# Patient Record
Sex: FEMALE | Race: BLACK OR AFRICAN AMERICAN | NOT HISPANIC OR LATINO | Employment: FULL TIME | URBAN - METROPOLITAN AREA
[De-identification: names, ages, dates, MRNs, and addresses within clinical notes are randomized per-mention and may not be internally consistent; named-entity substitution may affect disease eponyms.]

---

## 2020-02-20 ENCOUNTER — HOSPITAL ENCOUNTER (EMERGENCY)
Facility: HOSPITAL | Age: 50
Discharge: HOME | End: 2020-02-20
Attending: EMERGENCY MEDICINE
Payer: MEDICAID

## 2020-02-20 VITALS
DIASTOLIC BLOOD PRESSURE: 94 MMHG | TEMPERATURE: 98 F | WEIGHT: 155.56 LBS | RESPIRATION RATE: 20 BRPM | SYSTOLIC BLOOD PRESSURE: 156 MMHG | OXYGEN SATURATION: 99 % | HEART RATE: 76 BPM

## 2020-02-20 DIAGNOSIS — E04.9 GOITER: ICD-10-CM

## 2020-02-20 DIAGNOSIS — H00.014 HORDEOLUM EXTERNUM OF LEFT UPPER EYELID: Primary | ICD-10-CM

## 2020-02-20 DIAGNOSIS — R03.0 ELEVATED BLOOD PRESSURE READING: ICD-10-CM

## 2020-02-20 LAB — HIV 1+2 AB+HIV1 P24 AG SERPL QL IA: NEGATIVE

## 2020-02-20 PROCEDURE — 99283 EMERGENCY DEPT VISIT LOW MDM: CPT | Mod: ER

## 2020-02-20 PROCEDURE — 86703 HIV-1/HIV-2 1 RESULT ANTBDY: CPT

## 2020-02-20 RX ORDER — ERYTHROMYCIN 5 MG/G
OINTMENT OPHTHALMIC
Qty: 1 TUBE | Refills: 0 | Status: SHIPPED | OUTPATIENT
Start: 2020-02-20 | End: 2020-10-12

## 2020-02-20 NOTE — ED PROVIDER NOTES
History     Chief Complaint   Patient presents with    Eye Problem     woke up with left eye swollen and crusted shut       Review of patient's allergies indicates:  No Known Allergies    History of Present Illness   HPI    2/20/2020, 8:09 AM  The history is provided by the patient    Angeline Waggoner is a 49 y.o. female presenting to the ED for swelling to the left eyelid.  Onset was this morning when she woke up.  Patient notes that she has been having her eyelid crusted shut this morning.  No eye pain, no double vision, fever, rhinorrhea, cough.  Nothing makes it better, nothing makes it worse.  Patient denies any trauma.  Symptoms are rated as moderate.  Denies trauma.      Arrival mode:  Personal Vehicle    PCP: Nnamdi Perea [facility]     Allergies:  Review of patient's allergies indicates:  No Known Allergies    Past Medical History:  History reviewed. No pertinent past medical history.    Past Surgical History:  History reviewed. No pertinent surgical history.      Family History:  History reviewed. No pertinent family history.    Social History:  Social History     Tobacco Use    Smoking status: Never Smoker   Substance and Sexual Activity    Alcohol use: Never     Frequency: Never    Drug use: Never    Sexual activity: Not on file        Review of Systems   Review of Systems   Constitutional: Negative for chills and fever.   HENT: Positive for facial swelling (Left Upper Eyelid swelling. ). Negative for postnasal drip, rhinorrhea, sinus pain, sneezing and sore throat.    Eyes: Positive for discharge. Negative for photophobia, pain, redness, itching and visual disturbance.   Respiratory: Negative for cough.    Allergic/Immunologic: Negative for environmental allergies.          Physical Exam     Initial Vitals [02/20/20 0811]   BP Pulse Resp Temp SpO2   (!) 156/94 76 20 98.3 °F (36.8 °C) 99 %      MAP       --          Physical Exam    Nursing Notes and Vital Signs Reviewed.  Constitutional:  Patient is in no apparent distress. Well-developed and well-nourished.  Head: Atraumatic. Normocephalic.  Eyes: PERRL. EOM intact. Conjunctivae are not pale. No scleral icterus.  There is swelling noted to the left upper lateral eyelid.  Pupils are reactive. No drainage or discharge appreciated. Extraocular muscles are intact. No pain with direct or consensual light reflex. Minimal conjunctival injection.        ENT: Mucous membranes are moist. Oropharynx is clear and symmetric.  TMs pearly gray bilaterally. Pharynx without erythema or exudate.  Nasal mucosa is normal.  Neck: Supple. Full ROM. No lymphadenopathy.  Positive for goiter.  Cardiovascular: Regular rate. Regular rhythm. No murmurs, rubs, or gallops. Distal pulses are 2+ and symmetric.  Pulmonary/Chest: No respiratory distress. Clear to auscultation bilaterally. No wheezing or rales.  Musculoskeletal: Moves all extremities. No obvious deformities. No edema. No calf tenderness.  Skin: Warm and dry.  Neurological:  Alert, awake, and appropriate.  Normal speech.  No acute focal neurological deficits are appreciated.  Psychiatric: Normal affect. Good eye contact. Appropriate in content.     ED Course     Procedures    ED Vital Signs:  Vitals:    02/20/20 0811   BP: (!) 156/94   Pulse: 76   Resp: 20   Temp: 98.3 °F (36.8 °C)   TempSrc: Oral   SpO2: 99%   Weight: 70.5 kg (155 lb 8.6 oz)       Abnormal Lab Results:  Labs Reviewed   HIV 1 / 2 ANTIBODY        All Lab Results:None        Imaging Results:  Imaging Results    None          The Emergency Provider reviewed the vital signs and test results, which are outlined above.     ED Discussion      8:28 AM  Reassessment: Dr. Christian reassessed the pt.  The pt is resting comfortably and is NAD.  Pt states their sx have improved. Discussed test results, shared treatment plan, specific conditions for return, and the need for f/u.  Answered their questions at this time.  Pt understands and agrees to the plan.   Discussed findings of goiter.  Recommended patient follow up regarding her goiter.  The pt has remained hemodynamically stable through ED course and is stable for discharge.    I discussed with patient and/or family/caretaker that evaluation in the ED does not suggest any emergent or life threatening medical conditions requiring immediate intervention beyond what was provided in the ED, and I believe patient is safe for discharge.  Regardless, an unremarkable evaluation in the ED does not preclude the development or presence of a serious of life threatening condition. As such, patient was instructed to return immediately for any worsening or change in current symptoms.      ED Medication(s):  Medications - No data to display       Medication List      START taking these medications    erythromycin ophthalmic ointment  Commonly known as:  ROMYCIN  Place a 1/2 inch ribbon to left eye every every 6 hours for 7 days.           Where to Get Your Medications      You can get these medications from any pharmacy    Bring a paper prescription for each of these medications  · erythromycin ophthalmic ointment         Follow-up Information     [facility] In 2 days.    Why:  To evaluate goiter.  Return to emergency department for worsening pain, swelling, bowel pain, double vision, fever, worsening swelling, or other concerns.  Contact information:  [address]  [place] [id number] [phone]                        MIPS Measures     Smoker? No     Hypertension: Pre-hypertension/Hypertension: The pt has been informed that they may have pre-hypertension or hypertension based on a blood pressure reading in the ED. I recommend that the pt call the PCP listed on their discharge instructions or a physician of their choice this week to arrange f/u for further evaluation of possible pre-hypertension or hypertension.        Medical Decision Making                 MDM  Reviewed: vitals and nursing  "note          Portions of this note may have been created with voice recognition software. Occasional "wrong-word" or "sound-a-like" substitutions may have occurred due to the inherent limitations of voice recognition software. Please, read the note carefully and recognize, using context, where substitutions have occurred.        Clinical Impression       ICD-10-CM ICD-9-CM   1. Hordeolum externum of left upper eyelid H00.014 373.11   2. Elevated blood pressure reading R03.0 796.2   3. Goiter E04.9 240.9            Disposition: Discharge to home  Patient condition: Stable           Alla Christian, DO  02/20/20 0833       Alla Christian, DO  02/20/20 0834    "

## 2020-02-20 NOTE — DISCHARGE INSTRUCTIONS
To ease your symptoms and help your stye get better, you can put a warm, wet compress on the stye. Wet a clean washcloth with warm water and put it over your stye. When the washcloth cools, reheat it with warm water and put it back over the stye. Repeat these steps for about 15 minutes, and try to do this 4 times a day.  You should not squeeze or pop your stye. This can make it worse. Also, you should not wear eye makeup or contact lenses until your stye is all better.

## 2020-10-12 ENCOUNTER — HOSPITAL ENCOUNTER (OUTPATIENT)
Facility: HOSPITAL | Age: 50
Discharge: HOME | End: 2020-10-14
Attending: EMERGENCY MEDICINE | Admitting: INTERNAL MEDICINE
Payer: MEDICAID

## 2020-10-12 DIAGNOSIS — N93.8 DUB (DYSFUNCTIONAL UTERINE BLEEDING): ICD-10-CM

## 2020-10-12 DIAGNOSIS — D64.9 SYMPTOMATIC ANEMIA: Primary | ICD-10-CM

## 2020-10-12 DIAGNOSIS — R31.9 URINARY TRACT INFECTION WITH HEMATURIA, SITE UNSPECIFIED: ICD-10-CM

## 2020-10-12 DIAGNOSIS — R79.89 ELEVATED TROPONIN: ICD-10-CM

## 2020-10-12 DIAGNOSIS — N39.0 URINARY TRACT INFECTION WITH HEMATURIA, SITE UNSPECIFIED: ICD-10-CM

## 2020-10-12 DIAGNOSIS — E87.6 HYPOKALEMIA: ICD-10-CM

## 2020-10-12 DIAGNOSIS — R42 DIZZINESS: ICD-10-CM

## 2020-10-12 DIAGNOSIS — I10 HYPERTENSION, UNSPECIFIED TYPE: ICD-10-CM

## 2020-10-12 LAB
ABO + RH BLD: NORMAL
ALBUMIN SERPL BCP-MCNC: 3.6 G/DL (ref 3.5–5.2)
ALP SERPL-CCNC: 47 U/L (ref 55–135)
ALT SERPL W/O P-5'-P-CCNC: 18 U/L (ref 10–44)
ANION GAP SERPL CALC-SCNC: 11 MMOL/L (ref 8–16)
ANISOCYTOSIS BLD QL SMEAR: ABNORMAL
AST SERPL-CCNC: 18 U/L (ref 10–40)
B-HCG UR QL: NEGATIVE
BACTERIA #/AREA URNS AUTO: ABNORMAL /HPF
BASOPHILS # BLD AUTO: 0.04 K/UL (ref 0–0.2)
BASOPHILS NFR BLD: 0.6 % (ref 0–1.9)
BILIRUB SERPL-MCNC: 0.3 MG/DL (ref 0.1–1)
BILIRUB UR QL STRIP: NEGATIVE
BLD GP AB SCN CELLS X3 SERPL QL: NORMAL
BNP SERPL-MCNC: <10 PG/ML (ref 0–99)
BUN SERPL-MCNC: 7 MG/DL (ref 6–20)
CALCIUM SERPL-MCNC: 8.4 MG/DL (ref 8.7–10.5)
CHLORIDE SERPL-SCNC: 110 MMOL/L (ref 95–110)
CLARITY UR REFRACT.AUTO: ABNORMAL
CO2 SERPL-SCNC: 18 MMOL/L (ref 23–29)
COLOR UR AUTO: ABNORMAL
CREAT SERPL-MCNC: 0.8 MG/DL (ref 0.5–1.4)
DACRYOCYTES BLD QL SMEAR: ABNORMAL
DIFFERENTIAL METHOD: ABNORMAL
EOSINOPHIL # BLD AUTO: 0 K/UL (ref 0–0.5)
EOSINOPHIL NFR BLD: 0 % (ref 0–8)
ERYTHROCYTE [DISTWIDTH] IN BLOOD BY AUTOMATED COUNT: 18.4 % (ref 11.5–14.5)
EST. GFR  (AFRICAN AMERICAN): >60 ML/MIN/1.73 M^2
EST. GFR  (NON AFRICAN AMERICAN): >60 ML/MIN/1.73 M^2
GLUCOSE SERPL-MCNC: 111 MG/DL (ref 70–110)
GLUCOSE UR QL STRIP: ABNORMAL
HCT VFR BLD AUTO: 17.9 % (ref 37–48.5)
HGB BLD-MCNC: 5 G/DL (ref 12–16)
HGB UR QL STRIP: ABNORMAL
HYALINE CASTS UR QL AUTO: 0 /LPF
HYPOCHROMIA BLD QL SMEAR: ABNORMAL
IMM GRANULOCYTES # BLD AUTO: 0.01 K/UL (ref 0–0.04)
IMM GRANULOCYTES NFR BLD AUTO: 0.2 % (ref 0–0.5)
KETONES UR QL STRIP: ABNORMAL
LEUKOCYTE ESTERASE UR QL STRIP: ABNORMAL
LYMPHOCYTES # BLD AUTO: 1.8 K/UL (ref 1–4.8)
LYMPHOCYTES NFR BLD: 27.4 % (ref 18–48)
MCH RBC QN AUTO: 19.2 PG (ref 27–31)
MCHC RBC AUTO-ENTMCNC: 27.9 G/DL (ref 32–36)
MCV RBC AUTO: 69 FL (ref 82–98)
MICROSCOPIC COMMENT: ABNORMAL
MONOCYTES # BLD AUTO: 0.5 K/UL (ref 0.3–1)
MONOCYTES NFR BLD: 7 % (ref 4–15)
NEUTROPHILS # BLD AUTO: 4.3 K/UL (ref 1.8–7.7)
NEUTROPHILS NFR BLD: 64.8 % (ref 38–73)
NITRITE UR QL STRIP: POSITIVE
NRBC BLD-RTO: 0 /100 WBC
OVALOCYTES BLD QL SMEAR: ABNORMAL
PH UR STRIP: 5 [PH] (ref 5–8)
PLATELET # BLD AUTO: 322 K/UL (ref 150–350)
PMV BLD AUTO: 10.2 FL (ref 9.2–12.9)
POIKILOCYTOSIS BLD QL SMEAR: SLIGHT
POLYCHROMASIA BLD QL SMEAR: ABNORMAL
POTASSIUM SERPL-SCNC: 3.3 MMOL/L (ref 3.5–5.1)
PROT SERPL-MCNC: 7.4 G/DL (ref 6–8.4)
PROT UR QL STRIP: ABNORMAL
RBC # BLD AUTO: 2.61 M/UL (ref 4–5.4)
RBC #/AREA URNS AUTO: >100 /HPF (ref 0–4)
SARS-COV-2 RDRP RESP QL NAA+PROBE: NEGATIVE
SCHISTOCYTES BLD QL SMEAR: ABNORMAL
SODIUM SERPL-SCNC: 139 MMOL/L (ref 136–145)
SP GR UR STRIP: 1.01 (ref 1–1.03)
SQUAMOUS #/AREA URNS AUTO: 2 /HPF
TROPONIN I SERPL DL<=0.01 NG/ML-MCNC: 0.03 NG/ML (ref 0–0.03)
URN SPEC COLLECT METH UR: ABNORMAL
UROBILINOGEN UR STRIP-ACNC: >=8 EU/DL
WBC # BLD AUTO: 6.6 K/UL (ref 3.9–12.7)
WBC #/AREA URNS AUTO: 10 /HPF (ref 0–5)

## 2020-10-12 PROCEDURE — 83880 ASSAY OF NATRIURETIC PEPTIDE: CPT | Mod: ER

## 2020-10-12 PROCEDURE — U0002 COVID-19 LAB TEST NON-CDC: HCPCS | Mod: ER

## 2020-10-12 PROCEDURE — 93010 ELECTROCARDIOGRAM REPORT: CPT | Mod: ,,, | Performed by: INTERNAL MEDICINE

## 2020-10-12 PROCEDURE — 87040 BLOOD CULTURE FOR BACTERIA: CPT

## 2020-10-12 PROCEDURE — 86880 COOMBS TEST DIRECT: CPT

## 2020-10-12 PROCEDURE — 25000003 PHARM REV CODE 250: Mod: ER | Performed by: EMERGENCY MEDICINE

## 2020-10-12 PROCEDURE — 36415 COLL VENOUS BLD VENIPUNCTURE: CPT

## 2020-10-12 PROCEDURE — 63600175 PHARM REV CODE 636 W HCPCS: Mod: ER | Performed by: EMERGENCY MEDICINE

## 2020-10-12 PROCEDURE — 93010 EKG 12-LEAD: ICD-10-PCS | Mod: ,,, | Performed by: INTERNAL MEDICINE

## 2020-10-12 PROCEDURE — 99291 CRITICAL CARE FIRST HOUR: CPT | Mod: ER

## 2020-10-12 PROCEDURE — 86920 COMPATIBILITY TEST SPIN: CPT

## 2020-10-12 PROCEDURE — 93005 ELECTROCARDIOGRAM TRACING: CPT | Mod: ER

## 2020-10-12 PROCEDURE — 84484 ASSAY OF TROPONIN QUANT: CPT | Mod: ER

## 2020-10-12 PROCEDURE — G0378 HOSPITAL OBSERVATION PER HR: HCPCS | Mod: ER

## 2020-10-12 PROCEDURE — 81000 URINALYSIS NONAUTO W/SCOPE: CPT | Mod: ER

## 2020-10-12 PROCEDURE — 85025 COMPLETE CBC W/AUTO DIFF WBC: CPT | Mod: ER

## 2020-10-12 PROCEDURE — 96361 HYDRATE IV INFUSION ADD-ON: CPT | Mod: ER

## 2020-10-12 PROCEDURE — 96365 THER/PROPH/DIAG IV INF INIT: CPT | Performed by: EMERGENCY MEDICINE

## 2020-10-12 PROCEDURE — G0378 HOSPITAL OBSERVATION PER HR: HCPCS

## 2020-10-12 PROCEDURE — 83010 ASSAY OF HAPTOGLOBIN QUANT: CPT

## 2020-10-12 PROCEDURE — 36430 TRANSFUSION BLD/BLD COMPNT: CPT

## 2020-10-12 PROCEDURE — 81025 URINE PREGNANCY TEST: CPT | Mod: ER

## 2020-10-12 PROCEDURE — 86901 BLOOD TYPING SEROLOGIC RH(D): CPT

## 2020-10-12 PROCEDURE — P9016 RBC LEUKOCYTES REDUCED: HCPCS

## 2020-10-12 PROCEDURE — 83540 ASSAY OF IRON: CPT

## 2020-10-12 PROCEDURE — 82728 ASSAY OF FERRITIN: CPT

## 2020-10-12 PROCEDURE — 80053 COMPREHEN METABOLIC PANEL: CPT | Mod: ER

## 2020-10-12 RX ORDER — IBUPROFEN 200 MG
24 TABLET ORAL
Status: DISCONTINUED | OUTPATIENT
Start: 2020-10-12 | End: 2020-10-14 | Stop reason: HOSPADM

## 2020-10-12 RX ORDER — GLUCAGON 1 MG
1 KIT INJECTION
Status: DISCONTINUED | OUTPATIENT
Start: 2020-10-12 | End: 2020-10-14 | Stop reason: HOSPADM

## 2020-10-12 RX ORDER — SODIUM CHLORIDE 0.9 % (FLUSH) 0.9 %
10 SYRINGE (ML) INJECTION
Status: DISCONTINUED | OUTPATIENT
Start: 2020-10-12 | End: 2020-10-14 | Stop reason: HOSPADM

## 2020-10-12 RX ORDER — IBUPROFEN 200 MG
16 TABLET ORAL
Status: DISCONTINUED | OUTPATIENT
Start: 2020-10-12 | End: 2020-10-14 | Stop reason: HOSPADM

## 2020-10-12 RX ORDER — POTASSIUM CHLORIDE 20 MEQ/1
40 TABLET, EXTENDED RELEASE ORAL
Status: COMPLETED | OUTPATIENT
Start: 2020-10-12 | End: 2020-10-12

## 2020-10-12 RX ORDER — HYDROCODONE BITARTRATE AND ACETAMINOPHEN 500; 5 MG/1; MG/1
TABLET ORAL
Status: DISCONTINUED | OUTPATIENT
Start: 2020-10-12 | End: 2020-10-14 | Stop reason: HOSPADM

## 2020-10-12 RX ADMIN — CEFTRIAXONE 1 G: 1 INJECTION, SOLUTION INTRAVENOUS at 03:10

## 2020-10-12 RX ADMIN — POTASSIUM CHLORIDE 40 MEQ: 1500 TABLET, EXTENDED RELEASE ORAL at 03:10

## 2020-10-12 RX ADMIN — SODIUM CHLORIDE 1000 ML: 0.9 INJECTION, SOLUTION INTRAVENOUS at 01:10

## 2020-10-12 NOTE — ED NOTES
"Patient states she came to ED today because "I was bleeding too much, I was at work and I started feeling lightheaded". Pt states she has been having abnormal vaginal bleeding x3-4 weeks, and has been going through 3-4 16 count boxes of tampons, every 3-4 days. Patient states "Loida been feeling weak". Patient states she was taking something for blood pressure medication but states she took herself off "and I shouldn't have" pt states.  "

## 2020-10-12 NOTE — ED PROVIDER NOTES
Encounter Date: 10/12/2020       History     Chief Complaint   Patient presents with    Dizziness     pt c/o having a period for past month. light headed since Sat     The history is provided by the patient.   Dizziness  This is a new problem. The current episode started yesterday. The problem occurs daily. The problem has not changed since onset.Pertinent negatives include no chest pain, no abdominal pain, no headaches and no shortness of breath. Associated symptoms comments: Vaginal Bleeding for several weeks. The symptoms are aggravated by walking. The symptoms are relieved by rest. She has tried rest for the symptoms. The treatment provided mild relief.     Review of patient's allergies indicates:   Allergen Reactions    Aspirin      Past Medical History:   Diagnosis Date    Hypertension      History reviewed. No pertinent surgical history.  History reviewed. No pertinent family history.  Social History     Tobacco Use    Smoking status: Never Smoker   Substance Use Topics    Alcohol use: Never     Frequency: Never    Drug use: Never     Review of Systems   Respiratory: Negative for shortness of breath.    Cardiovascular: Negative for chest pain.   Gastrointestinal: Negative for abdominal pain.   Genitourinary: Positive for vaginal bleeding.   Neurological: Positive for dizziness. Negative for headaches.   All other systems reviewed and are negative.      Physical Exam     Initial Vitals [10/12/20 1326]   BP Pulse Resp Temp SpO2   (!) 141/91 98 18 98.9 °F (37.2 °C) 100 %      MAP       --         Physical Exam    Nursing note and vitals reviewed.  Constitutional: She appears well-developed and well-nourished. No distress.   HENT:   Head: Normocephalic and atraumatic.   Mouth/Throat: Oropharynx is clear and moist.   Eyes: Conjunctivae and EOM are normal. Pupils are equal, round, and reactive to light.   Neck: Normal range of motion. Neck supple.   Cardiovascular: Normal rate, regular rhythm and normal heart  sounds.   Pulmonary/Chest: Breath sounds normal. No respiratory distress.   Abdominal: Soft. Bowel sounds are normal. She exhibits no distension. There is no abdominal tenderness.   Musculoskeletal: Normal range of motion.   Neurological: She is alert and oriented to person, place, and time. She has normal strength.   Skin: Skin is warm and dry. There is pallor.   Psychiatric: She has a normal mood and affect. Thought content normal.         ED Course   Critical Care    Date/Time: 10/12/2020 3:32 PM  Performed by: Anatoliy Wall MD  Authorized by: Anatoliy Wall MD   Total critical care time (exclusive of procedural time) : 45 minutes  Critical care time was exclusive of separately billable procedures and treating other patients.  Critical care was necessary to treat or prevent imminent or life-threatening deterioration of the following conditions: cardiac failure and circulatory failure.        Labs Reviewed   CBC W/ AUTO DIFFERENTIAL - Abnormal; Notable for the following components:       Result Value    RBC 2.61 (*)     Hemoglobin 5.0 (*)     Hematocrit 17.9 (*)     Mean Corpuscular Volume 69 (*)     Mean Corpuscular Hemoglobin 19.2 (*)     Mean Corpuscular Hemoglobin Conc 27.9 (*)     RDW 18.4 (*)     All other components within normal limits    Narrative:        HGB and HCT critical result(s) called and verbal readback obtained   from Kavita Machado RN 10/12/20 14:24 [facility].  by [facility] 10/12/2020 14:24   COMPREHENSIVE METABOLIC PANEL - Abnormal; Notable for the following components:    Potassium 3.3 (*)     CO2 18 (*)     Glucose 111 (*)     Calcium 8.4 (*)     Alkaline Phosphatase 47 (*)     All other components within normal limits   URINALYSIS, REFLEX TO URINE CULTURE - Abnormal; Notable for the following components:    Color, UA Red (*)     Appearance, UA Cloudy (*)     Protein, UA 3+ (*)     Glucose, UA Trace (*)     Ketones, UA 2+ (*)     Occult Blood UA 3+ (*)     Nitrite, UA Positive (*)      Urobilinogen, UA >=8.0 (*)     Leukocytes, UA 2+ (*)     All other components within normal limits    Narrative:     Specimen Source->Urine   TROPONIN I - Abnormal; Notable for the following components:    Troponin I 0.031 (*)     All other components within normal limits   URINALYSIS MICROSCOPIC - Abnormal; Notable for the following components:    RBC, UA >100 (*)     WBC, UA 10 (*)     All other components within normal limits    Narrative:     Specimen Source->Urine   CULTURE, BLOOD   CULTURE, BLOOD   PREGNANCY TEST, URINE RAPID    Narrative:     Specimen Source->Urine   B-TYPE NATRIURETIC PEPTIDE   SARS-COV-2 RNA AMPLIFICATION, QUAL     Results for orders placed or performed during the hospital encounter of 10/12/20   CBC W/ AUTO DIFFERENTIAL   Result Value Ref Range    WBC 6.60 3.90 - 12.70 K/uL    RBC 2.61 (L) 4.00 - 5.40 M/uL    Hemoglobin 5.0 (LL) 12.0 - 16.0 g/dL    Hematocrit 17.9 (LL) 37.0 - 48.5 %    Mean Corpuscular Volume 69 (L) 82 - 98 fL    Mean Corpuscular Hemoglobin 19.2 (L) 27.0 - 31.0 pg    Mean Corpuscular Hemoglobin Conc 27.9 (L) 32.0 - 36.0 g/dL    RDW 18.4 (H) 11.5 - 14.5 %    Platelets 322 150 - 350 K/uL    MPV 10.2 9.2 - 12.9 fL    Immature Granulocytes 0.2 0.0 - 0.5 %    Gran # (ANC) 4.3 1.8 - 7.7 K/uL    Immature Grans (Abs) 0.01 0.00 - 0.04 K/uL    Lymph # 1.8 1.0 - 4.8 K/uL    Mono # 0.5 0.3 - 1.0 K/uL    Eos # 0.0 0.0 - 0.5 K/uL    Baso # 0.04 0.00 - 0.20 K/uL    nRBC 0 0 /100 WBC    Gran% 64.8 38.0 - 73.0 %    Lymph% 27.4 18.0 - 48.0 %    Mono% 7.0 4.0 - 15.0 %    Eosinophil% 0.0 0.0 - 8.0 %    Basophil% 0.6 0.0 - 1.9 %    Differential Method Automated    Comp. Metabolic Panel   Result Value Ref Range    Sodium 139 136 - 145 mmol/L    Potassium 3.3 (L) 3.5 - 5.1 mmol/L    Chloride 110 95 - 110 mmol/L    CO2 18 (L) 23 - 29 mmol/L    Glucose 111 (H) 70 - 110 mg/dL    BUN, Bld 7 6 - 20 mg/dL    Creatinine 0.8 0.5 - 1.4 mg/dL    Calcium 8.4 (L) 8.7 - 10.5 mg/dL    Total Protein 7.4 6.0  - 8.4 g/dL    Albumin 3.6 3.5 - 5.2 g/dL    Total Bilirubin 0.3 0.1 - 1.0 mg/dL    Alkaline Phosphatase 47 (L) 55 - 135 U/L    AST 18 10 - 40 U/L    ALT 18 10 - 44 U/L    Anion Gap 11 8 - 16 mmol/L    eGFR if African American >60.0 >60 mL/min/1.73 m^2    eGFR if non African American >60.0 >60 mL/min/1.73 m^2   Pregnancy, urine rapid   Result Value Ref Range    Preg Test, Ur Negative    Urinalysis, Reflex to Urine Culture Urine, Clean Catch    Specimen: Urine   Result Value Ref Range    Specimen UA Urine, Clean Catch     Color, UA Red (A) Yellow, Straw, Shanna    Appearance, UA Cloudy (A) Clear    pH, UA 5.0 5.0 - 8.0    Specific Gravity, UA 1.015 1.005 - 1.030    Protein, UA 3+ (A) Negative    Glucose, UA Trace (A) Negative    Ketones, UA 2+ (A) Negative    Bilirubin (UA) Negative Negative    Occult Blood UA 3+ (A) Negative    Nitrite, UA Positive (A) Negative    Urobilinogen, UA >=8.0 (A) <2.0 EU/dL    Leukocytes, UA 2+ (A) Negative   Troponin I   Result Value Ref Range    Troponin I 0.031 (H) 0.000 - 0.026 ng/mL   Brain natriuretic peptide   Result Value Ref Range    BNP <10 0 - 99 pg/mL   Urinalysis Microscopic   Result Value Ref Range    RBC, UA >100 (H) 0 - 4 /hpf    WBC, UA 10 (H) 0 - 5 /hpf    Bacteria Occasional None-Occ /hpf    Squam Epithel, UA 2 /hpf    Hyaline Casts, UA 0 0-1/lpf /lpf    Microscopic Comment SEE COMMENT    COVID-19 Rapid Screening   Result Value Ref Range    SARS-CoV-2 RNA, Amplification, Qual Negative Negative          ECG Results          EKG 12-lead (In process)  Result time 10/12/20 14:42:04    In process by Interface, Lab In [facility] (10/12/20 14:42:04)                 Narrative:    Test Reason : R42,    Vent. Rate : 090 BPM     Atrial Rate : 090 BPM     P-R Int : 158 ms          QRS Dur : 080 ms      QT Int : 342 ms       P-R-T Axes : 027 018 115 degrees     QTc Int : 418 ms    Normal sinus rhythm  Possible Left atrial enlargement  LVH  T wave abnormality, consider lateral  ischemia  Abnormal ECG  No previous ECGs available    Referred By: [email or web address]   SELF           Confirmed By:                             Imaging Results          X-Ray Chest 1 View (Final result)  Result time 10/12/20 13:58:59    Final result by Александр Garcia MD (10/12/20 13:58:59)                 Impression:      1.  Negative for acute process involving the chest.    2.  Incidental findings as noted above.      Electronically signed by: Александр Garcia MD  Date:    10/12/2020  Time:    13:58             Narrative:    EXAMINATION:  XR CHEST 1 VIEW    CLINICAL HISTORY:  Dizziness and giddiness    COMPARISON:  No comparison studies are available.    FINDINGS:  EKG leads overlie the chest which is lordotic in position.  The lungs are clear. The cardiac silhouette size is normal. The trachea is midline and the mediastinal width is normal. Negative for focal infiltrate, effusion or pneumothorax. Pulmonary vasculature is normal. Negative for osseous abnormalities. Tortuous aorta.  Marginal spondylosis with minimal convex left curvature of the thoracolumbar junction.                            3:15 PM GYN Consult- , Provera 10mg q d for 2 weeks, transfuse.   3:35 PM Discussed lab/imaging studies with patient and the need for further evaluation/admission for Anemia, blood tx, Observation. Pt verbalized understanding that this is a stand alone ER and we are unable to admit at this facility. Pt will be transferred to [facility] via [facility] with care en route to include Cardiac Monitoring. I discussed this case with HS and care was accepted by Dr Rascon.                                       Clinical Impression:       ICD-10-CM ICD-9-CM   1. Symptomatic anemia  D64.9 285.9   2. Dizziness  R42 780.4   3. DUB (dysfunctional uterine bleeding)  N93.8 626.8   4. Hypertension, unspecified type  I10 401.9   5. Hypokalemia  E87.6 276.8   6. Urinary tract infection with hematuria, site unspecified  N39.0 599.0    R31.9 599.70    7. Elevated troponin  R77.8 790.6                      Disposition:   Disposition: Placed in Observation  Condition: Fair     ED Disposition Condition    Observation                             Anatoliy Wall MD  10/12/20 1536

## 2020-10-12 NOTE — PROGRESS NOTES
"Pt arrived to floor from [facility] ED   Pads provided to pt and instructed to keep a hourly count   Tele monitor placed ST on tele   BP (!) 177/74   Pulse 106   Temp 98.8 °F (37.1 °C)   Resp 18   Ht 5' 2" (1.575 m)   Wt 75.1 kg (165 lb 10.8 oz)   SpO2 100%   Breastfeeding No   BMI 30.30 kg/m²    /67   Pulse 78   Temp 98.8 °F (37.1 °C)   Resp 18   Ht 5' 2" (1.575 m)   Wt 75.1 kg (165 lb 10.8 oz)   SpO2 100%   Breastfeeding No   BMI 30.30 kg/m²     Fall precautions in place   notified MD of pt arrival   "

## 2020-10-13 PROBLEM — N93.9 VAGINAL BLEEDING: Status: ACTIVE | Noted: 2020-10-13

## 2020-10-13 PROBLEM — D50.0 IRON DEFICIENCY ANEMIA DUE TO CHRONIC BLOOD LOSS: Status: ACTIVE | Noted: 2020-10-13

## 2020-10-13 PROBLEM — N92.0 MENORRHAGIA: Status: ACTIVE | Noted: 2020-10-13

## 2020-10-13 PROBLEM — R55 PRE-SYNCOPE: Status: ACTIVE | Noted: 2020-10-13

## 2020-10-13 PROBLEM — D25.2 INTRAMURAL AND SUBSEROUS LEIOMYOMA OF UTERUS: Status: ACTIVE | Noted: 2020-10-13

## 2020-10-13 PROBLEM — D25.1 INTRAMURAL AND SUBSEROUS LEIOMYOMA OF UTERUS: Status: ACTIVE | Noted: 2020-10-13

## 2020-10-13 PROBLEM — E87.6 HYPOKALEMIA: Status: ACTIVE | Noted: 2020-10-13

## 2020-10-13 LAB
ANION GAP SERPL CALC-SCNC: 6 MMOL/L (ref 8–16)
BASOPHILS # BLD AUTO: 0.04 K/UL (ref 0–0.2)
BASOPHILS NFR BLD: 0.7 % (ref 0–1.9)
BLD PROD TYP BPU: NORMAL
BLD PROD TYP BPU: NORMAL
BLOOD UNIT EXPIRATION DATE: NORMAL
BLOOD UNIT EXPIRATION DATE: NORMAL
BLOOD UNIT TYPE CODE: 5100
BLOOD UNIT TYPE CODE: 5100
BLOOD UNIT TYPE: NORMAL
BLOOD UNIT TYPE: NORMAL
BUN SERPL-MCNC: 6 MG/DL (ref 6–20)
CALCIUM SERPL-MCNC: 7.9 MG/DL (ref 8.7–10.5)
CHLORIDE SERPL-SCNC: 113 MMOL/L (ref 95–110)
CO2 SERPL-SCNC: 19 MMOL/L (ref 23–29)
CODING SYSTEM: NORMAL
CODING SYSTEM: NORMAL
CREAT SERPL-MCNC: 0.8 MG/DL (ref 0.5–1.4)
DAT IGG-SP REAG RBC-IMP: NORMAL
DIFFERENTIAL METHOD: ABNORMAL
DISPENSE STATUS: NORMAL
DISPENSE STATUS: NORMAL
EOSINOPHIL # BLD AUTO: 0 K/UL (ref 0–0.5)
EOSINOPHIL NFR BLD: 0.7 % (ref 0–8)
ERYTHROCYTE [DISTWIDTH] IN BLOOD BY AUTOMATED COUNT: 19.3 % (ref 11.5–14.5)
EST. GFR  (AFRICAN AMERICAN): >60 ML/MIN/1.73 M^2
EST. GFR  (NON AFRICAN AMERICAN): >60 ML/MIN/1.73 M^2
FERRITIN SERPL-MCNC: <1 NG/ML (ref 20–300)
GLUCOSE SERPL-MCNC: 97 MG/DL (ref 70–110)
HAPTOGLOB SERPL-MCNC: 105 MG/DL (ref 30–250)
HCT VFR BLD AUTO: 25.9 % (ref 37–48.5)
HCT VFR BLD AUTO: 26.3 % (ref 37–48.5)
HGB BLD-MCNC: 7.8 G/DL (ref 12–16)
HGB BLD-MCNC: 8 G/DL (ref 12–16)
IMM GRANULOCYTES # BLD AUTO: 0.03 K/UL (ref 0–0.04)
IMM GRANULOCYTES NFR BLD AUTO: 0.6 % (ref 0–0.5)
IRON SERPL-MCNC: 18 UG/DL (ref 30–160)
LYMPHOCYTES # BLD AUTO: 1.7 K/UL (ref 1–4.8)
LYMPHOCYTES NFR BLD: 31.2 % (ref 18–48)
MAGNESIUM SERPL-MCNC: 1.8 MG/DL (ref 1.6–2.6)
MCH RBC QN AUTO: 22.5 PG (ref 27–31)
MCHC RBC AUTO-ENTMCNC: 30.1 G/DL (ref 32–36)
MCV RBC AUTO: 75 FL (ref 82–98)
MONOCYTES # BLD AUTO: 0.5 K/UL (ref 0.3–1)
MONOCYTES NFR BLD: 8.7 % (ref 4–15)
NEUTROPHILS # BLD AUTO: 3.1 K/UL (ref 1.8–7.7)
NEUTROPHILS NFR BLD: 58.1 % (ref 38–73)
NRBC BLD-RTO: 1 /100 WBC
NUM UNITS TRANS PACKED RBC: NORMAL
NUM UNITS TRANS PACKED RBC: NORMAL
PLATELET # BLD AUTO: 274 K/UL (ref 150–350)
PMV BLD AUTO: 10.6 FL (ref 9.2–12.9)
POTASSIUM SERPL-SCNC: 3.7 MMOL/L (ref 3.5–5.1)
RBC # BLD AUTO: 3.46 M/UL (ref 4–5.4)
SATURATED IRON: 3 % (ref 20–50)
SODIUM SERPL-SCNC: 138 MMOL/L (ref 136–145)
TOTAL IRON BINDING CAPACITY: 622 UG/DL (ref 250–450)
TRANSFERRIN SERPL-MCNC: 420 MG/DL (ref 200–375)
WBC # BLD AUTO: 5.41 K/UL (ref 3.9–12.7)

## 2020-10-13 PROCEDURE — 85018 HEMOGLOBIN: CPT | Mod: 91

## 2020-10-13 PROCEDURE — 85014 HEMATOCRIT: CPT | Mod: 91

## 2020-10-13 PROCEDURE — G0378 HOSPITAL OBSERVATION PER HR: HCPCS

## 2020-10-13 PROCEDURE — 25000003 PHARM REV CODE 250: Performed by: PHYSICIAN ASSISTANT

## 2020-10-13 PROCEDURE — 25000003 PHARM REV CODE 250: Performed by: FAMILY MEDICINE

## 2020-10-13 PROCEDURE — 80048 BASIC METABOLIC PNL TOTAL CA: CPT

## 2020-10-13 PROCEDURE — 96375 TX/PRO/DX INJ NEW DRUG ADDON: CPT | Performed by: EMERGENCY MEDICINE

## 2020-10-13 PROCEDURE — 36415 COLL VENOUS BLD VENIPUNCTURE: CPT

## 2020-10-13 PROCEDURE — P9016 RBC LEUKOCYTES REDUCED: HCPCS

## 2020-10-13 PROCEDURE — 83735 ASSAY OF MAGNESIUM: CPT

## 2020-10-13 PROCEDURE — 63600175 PHARM REV CODE 636 W HCPCS: Performed by: FAMILY MEDICINE

## 2020-10-13 PROCEDURE — 85025 COMPLETE CBC W/AUTO DIFF WBC: CPT

## 2020-10-13 RX ORDER — FERROUS SULFATE 325(65) MG
325 TABLET, DELAYED RELEASE (ENTERIC COATED) ORAL 2 TIMES DAILY
Status: DISCONTINUED | OUTPATIENT
Start: 2020-10-13 | End: 2020-10-14 | Stop reason: HOSPADM

## 2020-10-13 RX ORDER — MEDROXYPROGESTERONE ACETATE 5 MG/1
10 TABLET ORAL 2 TIMES DAILY
Status: DISCONTINUED | OUTPATIENT
Start: 2020-10-13 | End: 2020-10-14 | Stop reason: HOSPADM

## 2020-10-13 RX ADMIN — FERROUS SULFATE TAB EC 325 MG (65 MG FE EQUIVALENT) 325 MG: 325 (65 FE) TABLET DELAYED RESPONSE at 10:10

## 2020-10-13 RX ADMIN — MEDROXYPROGESTERONE ACETATE 10 MG: 5 TABLET ORAL at 10:10

## 2020-10-13 RX ADMIN — IRON SUCROSE 200 MG: 20 INJECTION, SOLUTION INTRAVENOUS at 11:10

## 2020-10-13 NOTE — NURSING
Per MD order, orthostatic BP completed. Vitals:  10/13/20 1559   1600  1601  BP: (!) 166/85(lying); (!) 161/77(sitting); (!) 167/98(standing)

## 2020-10-13 NOTE — ASSESSMENT & PLAN NOTE
Dysfunctional uterine bleeding with resulting acute on chronic blood loss anemia  -patient will need complete work up as outpatient in GYN clinic, needs updated cervical cancer screening and likely EMB  -Thyroid studies  -Follow up pelvic US   -appropriate response to PRBC  -Start Provera 10 mg BID for continued heavy bleeding, pad counts

## 2020-10-13 NOTE — PLAN OF CARE
Pt remains fall free this shift.  Pt AAOx4, verbal, clear speech.  Skin warm and dry. No new skin issues.  Telemonitoring in progress, 60s-70s SR  Room air  Up to bathroom  2 U of RBCs administered and tolerated well.   Counting bloody pads.   Standby assist with transfers.  Bed low, side rails up x 2, wheels locked, call light in reach.   Bed alarm maintained for safety.   Patient instructed to call for assistance.   Hourly rounding completed.   24 hour chart check completed  POC updated and reviewed with pt. Will continue POC.

## 2020-10-13 NOTE — ASSESSMENT & PLAN NOTE
Gynae consult , pelvic ultrasound .  Will closely monitor bleeding     10/13:  Pelvic u/s pending  No treatment with hormonal therapy   Currently as etiology of AUB not   Evident as of yet  OBGYN consult pending

## 2020-10-13 NOTE — HOSPITAL COURSE
Patient was admitted for presyncope secondary to anemia from menorrhagia. PRBC were transfused, iron studies showed VALERIA. Venofer was given and ferrous sulfate was initiated. Pelvic u/s showed fibroids. OBGYN on case started provera. Patient's symptoms improved and she was discharged home with instructions to follow up with OBGYN outpatient.

## 2020-10-13 NOTE — ASSESSMENT & PLAN NOTE
10/13:  Pre-syncopal symptoms likely   Due to anemia   Symptoms improved with blood tranfusion  Will order orthostatics x1

## 2020-10-13 NOTE — HPI
49 YEAR OLD WOMAN -who presented with history of worsening dizziness over the last  several days . She usually has regular menstrual periods but reports that over the last several weeks ,she has developed persistent vaginal bleeding .   She denies any history of trauma .   In the Ed , initial labs showed - hemoglobin of 5.0, HCT -17.9.

## 2020-10-13 NOTE — CONSULTS
[facility]  Obstetrics & Gynecology  Consult Note    Patient Name: Angeline Waggoner  MRN: [id number]  Admission Date: 10/12/2020  Hospital Length of Stay: 0 days  Code Status: Full Code  Primary Care Provider: [facility]  Principal Problem: Symptomatic anemia    Inpatient consult to OB/GYN  Consult performed by: Yari Hare PA-C  Consult ordered by: Stanley Gracia MD        Subjective:     Chief Complaint: Menorrhagia and anemia    History of Present Illness:  Patient is a 50 yo F  who was admitted for menorrhagia resulting in severe symptomatic anemia. Patient reports hx of regular monthly periods that are not particularly heavy until last month when she had onset of bleeding now for the past 1 month. Reports heavy every day, using pads, no double up, along with frequent tampons, has gone through 4 boxes of tampons in the past month. +large clots. Denies dysmenorrhea or any pelvic pain or pressure. Denies urinary incontinence or changes in bowel habit. No vaginal discharge or new sexual partners. Hx of tubal ligation for contraception. Reports has been many years since last pap or well woman, at least 5 years but denies any history of abnormal pap smears.     Admitted with H/H 5.0/7.8 s/p 2 units PRBC with H/H now 7.8/25.9.            OB History    Para Term  AB Living   7 6 0 0 1 0   SAB TAB Ectopic Multiple Live Births   1 0 0 0 6      # Outcome Date GA Lbr Saúl/2nd Weight Sex Delivery Anes PTL Lv   7 Para            6 Para            5 Para            4 Para            3 Para            2 Para            1 SAB               Obstetric Comments   Reports all vaginal deliveries     Past Medical History:   Diagnosis Date    Hypertension      Past Surgical History:   Procedure Laterality Date    TUBAL LIGATION         No medications prior to admission.       Review of patient's allergies indicates:   Allergen Reactions    Aspirin         Family History     None         Tobacco Use    Smoking status: Never Smoker   Substance and Sexual Activity    Alcohol use: Never     Frequency: Never    Drug use: Never    Sexual activity: Not on file     Review of Systems   Constitutional: Positive for fatigue. Negative for activity change, chills and fever.   Respiratory: Negative for cough.    Cardiovascular: Negative for chest pain and leg swelling.   Gastrointestinal: Negative for abdominal pain, constipation, nausea and vomiting.   Genitourinary: Positive for menorrhagia, menstrual problem and vaginal bleeding. Negative for dysuria, frequency, hematuria, pelvic pain, urgency, vaginal discharge, vaginal pain and vaginal odor.   Integumentary:  Negative for rash.   Neurological:        Dizziness      Objective:     Vital Signs (Most Recent):  Temp: 98.1 °F (36.7 °C) (10/13/20 1559)  Pulse: 71 (10/13/20 1600)  Resp: 18 (10/13/20 1559)  BP: (!) 161/77 (10/13/20 1600)  SpO2: 100 % (10/13/20 1559) Vital Signs (24h Range):  Temp:  [97.7 °F (36.5 °C)-98.9 °F (37.2 °C)] 98.1 °F (36.7 °C)  Pulse:  [] 71  Resp:  [16-18] 18  SpO2:  [97 %-100 %] 100 %  BP: (112-177)/(57-87) 161/77     Weight: 73.6 kg (162 lb 4.1 oz)  Body mass index is 29.68 kg/m².    No LMP recorded.    Physical Exam:   Constitutional: She is oriented to person, place, and time. She appears well-developed and well-nourished. No distress.       Cardiovascular: Normal rate, regular rhythm and normal heart sounds.    No murmur heard.   Pulmonary/Chest: Effort normal and breath sounds normal. No respiratory distress. She has no wheezes. She has no rales.        Abdominal: Soft. Bowel sounds are normal. She exhibits no distension. There is no abdominal tenderness. There is no guarding.             Musculoskeletal: No edema.      Comments: No calf tenderness       Neurological: She is alert and oriented to person, place, and time.    Skin: Skin is warm and dry. No rash noted. She is not diaphoretic.        Laboratory:  CBC:    Recent Labs   Lab 10/13/20  0618   WBC 5.41   RBC 3.46*   HGB 7.8*   HCT 25.9*      MCV 75*   MCH 22.5*   MCHC 30.1*     CMP:   Recent Labs   Lab 10/12/20  1335 10/13/20  0618   * 97   CALCIUM 8.4* 7.9*   ALBUMIN 3.6  --    PROT 7.4  --     138   K 3.3* 3.7   CO2 18* 19*    113*   BUN 7 6   CREATININE 0.8 0.8   ALKPHOS 47*  --    ALT 18  --    AST 18  --    BILITOT 0.3  --        Diagnostic Results:  Labs: Reviewed    Assessment/Plan:     * Symptomatic anemia  S/p 2 units PRBC with appropriate response    Iron deficiency anemia due to chronic blood loss  Will need to continue iron    Menorrhagia  Dysfunctional uterine bleeding with resulting acute on chronic blood loss anemia  -patient will need complete work up as outpatient in GYN clinic, needs updated cervical cancer screening and likely EMB  -Thyroid studies  -Follow up pelvic US   -appropriate response to PRBC  -Start Provera 10 mg BID for continued heavy bleeding, pad counts          Yari Hare PA-C  Obstetrics & Gynecology  [facility]

## 2020-10-13 NOTE — SUBJECTIVE & OBJECTIVE
OB History    Para Term  AB Living   7 6 0 0 1 0   SAB TAB Ectopic Multiple Live Births   1 0 0 0 6      # Outcome Date GA Lbr Saúl/2nd Weight Sex Delivery Anes PTL Lv   7 Para            6 Para            5 Para            4 Para            3 Para            2 Para            1 SAB               Obstetric Comments   Reports all vaginal deliveries     Past Medical History:   Diagnosis Date    Hypertension      Past Surgical History:   Procedure Laterality Date    TUBAL LIGATION         No medications prior to admission.       Review of patient's allergies indicates:   Allergen Reactions    Aspirin         Family History     None        Tobacco Use    Smoking status: Never Smoker   Substance and Sexual Activity    Alcohol use: Never     Frequency: Never    Drug use: Never    Sexual activity: Not on file     Review of Systems   Constitutional: Positive for fatigue. Negative for activity change, chills and fever.   Respiratory: Negative for cough.    Cardiovascular: Negative for chest pain and leg swelling.   Gastrointestinal: Negative for abdominal pain, constipation, nausea and vomiting.   Genitourinary: Positive for menorrhagia, menstrual problem and vaginal bleeding. Negative for dysuria, frequency, hematuria, pelvic pain, urgency, vaginal discharge, vaginal pain and vaginal odor.   Integumentary:  Negative for rash.   Neurological:        Dizziness      Objective:     Vital Signs (Most Recent):  Temp: 98.1 °F (36.7 °C) (10/13/20 1559)  Pulse: 71 (10/13/20 1600)  Resp: 18 (10/13/20 1559)  BP: (!) 161/77 (10/13/20 1600)  SpO2: 100 % (10/13/20 1559) Vital Signs (24h Range):  Temp:  [97.7 °F (36.5 °C)-98.9 °F (37.2 °C)] 98.1 °F (36.7 °C)  Pulse:  [] 71  Resp:  [16-18] 18  SpO2:  [97 %-100 %] 100 %  BP: (112-177)/(57-87) 161/77     Weight: 73.6 kg (162 lb 4.1 oz)  Body mass index is 29.68 kg/m².    No LMP recorded.    Physical Exam:   Constitutional: She is oriented to person, place, and  time. She appears well-developed and well-nourished. No distress.       Cardiovascular: Normal rate, regular rhythm and normal heart sounds.    No murmur heard.   Pulmonary/Chest: Effort normal and breath sounds normal. No respiratory distress. She has no wheezes. She has no rales.        Abdominal: Soft. Bowel sounds are normal. She exhibits no distension. There is no abdominal tenderness. There is no guarding.             Musculoskeletal: No edema.      Comments: No calf tenderness       Neurological: She is alert and oriented to person, place, and time.    Skin: Skin is warm and dry. No rash noted. She is not diaphoretic.        Laboratory:  CBC:   Recent Labs   Lab 10/13/20  0618   WBC 5.41   RBC 3.46*   HGB 7.8*   HCT 25.9*      MCV 75*   MCH 22.5*   MCHC 30.1*     CMP:   Recent Labs   Lab 10/12/20  1335 10/13/20  0618   * 97   CALCIUM 8.4* 7.9*   ALBUMIN 3.6  --    PROT 7.4  --     138   K 3.3* 3.7   CO2 18* 19*    113*   BUN 7 6   CREATININE 0.8 0.8   ALKPHOS 47*  --    ALT 18  --    AST 18  --    BILITOT 0.3  --        Diagnostic Results:  Labs: Reviewed

## 2020-10-13 NOTE — ASSESSMENT & PLAN NOTE
Related to menorrhagia-will transfuse 2 units of packed red cells, will check iron stores.    10/13:  Severe iron deficiency noted  venofer given x1  Po ferrous sulfate  VALERIA likely due to menstrual bleeding

## 2020-10-13 NOTE — H&P
[facility] Hospital Medicine  History & Physical    Patient Name: Angeline Waggoner  MRN: [id number]  Admission Date: 10/12/2020  Attending Physician: Stanley Gracia MD   Primary Care Provider: [facility]         Patient information was obtained from patient, past medical records and ER records.     Subjective:     Principal Problem:Symptomatic anemia    Chief Complaint:   Chief Complaint   Patient presents with    Dizziness     pt c/o having a period for past month. light headed since Sat        HPI: 49 YEAR OLD WOMAN -who presented with history of worsening dizziness over the last  several days . She usually has regular menstrual periods but reports that over the last several weeks ,she has developed persistent vaginal bleeding .   She denies any history of trauma .   In the Ed , initial labs showed - hemoglobin of 5.0, HCT -17.9.    Patient will be placed on observation.      Past Medical History:   Diagnosis Date    Hypertension        History reviewed. No pertinent surgical history.    Review of patient's allergies indicates:   Allergen Reactions    Aspirin        No current facility-administered medications on file prior to encounter.      No current outpatient medications on file prior to encounter.     Family History     None        Tobacco Use    Smoking status: Never Smoker   Substance and Sexual Activity    Alcohol use: Never     Frequency: Never    Drug use: Never    Sexual activity: Not on file     Review of Systems   Constitutional: Negative for chills and fatigue.   HENT: Negative for congestion, ear pain, facial swelling, sinus pressure and sore throat.    Eyes: Negative for pain.   Respiratory: Negative for apnea, chest tightness, shortness of breath and stridor.    Cardiovascular: Negative for chest pain, palpitations and leg swelling.   Gastrointestinal: Negative for abdominal distention, abdominal pain, diarrhea and nausea.   Endocrine: Negative for polydipsia  and polyphagia.   Genitourinary: Positive for vaginal bleeding. Negative for decreased urine volume, difficulty urinating, frequency and genital sores.   Musculoskeletal: Negative for arthralgias and gait problem.   Neurological: Negative for light-headedness and headaches.   Hematological: Negative for adenopathy.   Psychiatric/Behavioral: Negative for agitation, confusion and decreased concentration.     Objective:     Vital Signs (Most Recent):  Temp: 98.1 °F (36.7 °C) (10/13/20 0320)  Pulse: 72 (10/13/20 0320)  Resp: 18 (10/13/20 0320)  BP: 112/64 (10/13/20 0320)  SpO2: 97 % (10/13/20 0320) Vital Signs (24h Range):  Temp:  [97.9 °F (36.6 °C)-98.9 °F (37.2 °C)] 98.1 °F (36.7 °C)  Pulse:  [] 72  Resp:  [16-20] 18  SpO2:  [97 %-100 %] 97 %  BP: (112-177)/(57-91) 112/64     Weight: 75.1 kg (165 lb 10.8 oz)  Body mass index is 30.3 kg/m².    Physical Exam  Vitals signs and nursing note reviewed.   Constitutional:       Appearance: She is well-developed.   HENT:      Head: Normocephalic and atraumatic.   Eyes:      Pupils: Pupils are equal, round, and reactive to light.   Neck:      Musculoskeletal: Normal range of motion and neck supple.      Thyroid: No thyromegaly.      Trachea: No tracheal deviation.   Cardiovascular:      Rate and Rhythm: Normal rate and regular rhythm.   Pulmonary:      Effort: No respiratory distress.      Breath sounds: No wheezing or rales.   Abdominal:      General: Bowel sounds are normal. There is no distension.      Palpations: Abdomen is soft.      Tenderness: There is no abdominal tenderness.   Skin:     General: Skin is warm and dry.      Coloration: Skin is not pale.   Neurological:      Mental Status: She is alert and oriented to person, place, and time.      Cranial Nerves: No cranial nerve deficit.      Coordination: Coordination normal.      Deep Tendon Reflexes: Reflexes are normal and symmetric.   Psychiatric:         Thought Content: Thought content normal.          Judgment: Judgment normal.           CRANIAL NERVES     CN III, IV, VI   Pupils are equal, round, and reactive to light.       Significant Labs:   BMP:   Recent Labs   Lab 10/12/20  1335   *      K 3.3*      CO2 18*   BUN 7   CREATININE 0.8   CALCIUM 8.4*     CBC:   Recent Labs   Lab 10/12/20  1335   WBC 6.60   HGB 5.0*   HCT 17.9*        All pertinent labs within the past 24 hours have been reviewed.    Significant Imaging: I have reviewed and interpreted all pertinent imaging results/findings within the past 24 hours.    Assessment/Plan:     * Symptomatic anemia    Related to menorrhagia-will transfuse 2 units of packed red cells, will check iron stores.       Hypokalemia    Will replete , check serum mag     Vaginal bleeding    Gynae consult , pelvic ultrasound .  Will closely monitor bleeding       VTE Risk Mitigation (From admission, onward)         Ordered     IP VTE HIGH RISK PATIENT  Once      10/12/20 1915     Place sequential compression device  Until discontinued      10/12/20 1915                   Tray Estrada MD  Department of Hospital Medicine   [facility]

## 2020-10-13 NOTE — SUBJECTIVE & OBJECTIVE
Interval History: No acute events overnight. Reports improvement in symptoms.     Review of Systems   Constitutional: Positive for fatigue. Negative for fever.   HENT: Negative for dental problem and sinus pressure.    Eyes: Negative for visual disturbance.   Respiratory: Negative for shortness of breath.    Cardiovascular: Negative for chest pain.   Gastrointestinal: Negative for nausea and vomiting.   Genitourinary: Positive for menstrual problem and vaginal bleeding. Negative for difficulty urinating and vaginal discharge.   Musculoskeletal: Negative for back pain.   Skin: Negative for rash.   Neurological: Positive for dizziness and light-headedness. Negative for syncope, weakness and headaches.   Psychiatric/Behavioral: Negative for confusion.     Objective:     Vital Signs (Most Recent):  Temp: 97.9 °F (36.6 °C) (10/13/20 1458)  Pulse: 65 (10/13/20 1458)  Resp: 18 (10/13/20 1458)  BP: (!) 144/65 (10/13/20 1458)  SpO2: 100 % (10/13/20 1458) Vital Signs (24h Range):  Temp:  [97.7 °F (36.5 °C)-98.9 °F (37.2 °C)] 97.9 °F (36.6 °C)  Pulse:  [] 65  Resp:  [16-18] 18  SpO2:  [97 %-100 %] 100 %  BP: (112-177)/(57-87) 144/65     Weight: 73.6 kg (162 lb 4.1 oz)  Body mass index is 29.68 kg/m².    Intake/Output Summary (Last 24 hours) at 10/13/2020 1533  Last data filed at 10/13/2020 1500  Gross per 24 hour   Intake 1426.83 ml   Output 1200 ml   Net 226.83 ml      Physical Exam  Constitutional:       General: She is not in acute distress.     Appearance: She is well-developed. She is not diaphoretic.   HENT:      Head: Normocephalic and atraumatic.   Eyes:      Pupils: Pupils are equal, round, and reactive to light.      Comments: Conjunctival palor    Cardiovascular:      Rate and Rhythm: Normal rate and regular rhythm.      Heart sounds: Normal heart sounds. No murmur. No friction rub. No gallop.    Pulmonary:      Effort: Pulmonary effort is normal. No respiratory distress.      Breath sounds: Normal breath  sounds. No stridor. No wheezing or rales.   Abdominal:      General: Bowel sounds are normal. There is no distension.      Palpations: Abdomen is soft. There is no mass.      Tenderness: There is no abdominal tenderness. There is no guarding.   Musculoskeletal:      Right lower leg: No edema.      Left lower leg: No edema.   Skin:     General: Skin is warm.      Findings: No erythema.   Neurological:      Mental Status: She is alert and oriented to person, place, and time.   Psychiatric:         Mood and Affect: Mood normal.         Behavior: Behavior normal.         Thought Content: Thought content normal.         Judgment: Judgment normal.         Significant Labs:   Recent Lab Results       10/13/20  0618   10/12/20  1950   10/12/20  1546        Unit Blood Type Code   5100[P]          5100       Unit Expiration   [id number][P]          [id number]       Unit Blood Type   O POS[P]          O POS       Anion Gap 6         Baso # 0.04         Basophil% 0.7         Blood Culture, Routine     No Growth to date[P]     BUN, Bld 6         Calcium 7.9         Chloride 113         CO2 19         CODING SYSTEM   GQXS934[P]          [id number]       Creatinine 0.8         Differential Method Automated         Direct Evita (TARA)   NEG       DISPENSE STATUS   ISSUED[P]          TRANSFUSED       eGFR if  >60         eGFR if non  >60  Comment:  Calculation used to obtain the estimated glomerular filtration  rate (eGFR) is the CKD-EPI equation.            Eos # 0.0         Eosinophil% 0.7         Ferritin   <1       Glucose 97         Gran # (ANC) 3.1         Gran% 58.1         Group & Rh   O POS       Haptoglobin   105       Hematocrit 25.9         Hemoglobin 7.8         Immature Grans (Abs) 0.03  Comment:  Mild elevation in immature granulocytes is non specific and   can be seen in a variety of conditions including stress response,   acute inflammation, trauma and pregnancy. Correlation with  other   laboratory and clinical findings is essential.           Immature Granulocytes 0.6         INDIRECT KARLI   NEG       Iron   18       Lymph # 1.7         Lymph% 31.2         Magnesium 1.8         MCH 22.5         MCHC 30.1         MCV 75         Mono # 0.5         Mono% 8.7         MPV 10.6         nRBC 1         Platelets 274         Potassium 3.7         Product Code   [id number][P]          [id number]       RBC 3.46         RDW 19.3         Saturated Iron   3       Sodium 138         TIBC   622       Transferrin   420       UNIT NUMBER   [id number][P]          [id number]       WBC 5.41             All pertinent labs within the past 24 hours have been reviewed.    Significant Imaging: I have reviewed all pertinent imaging results/findings within the past 24 hours.

## 2020-10-13 NOTE — HPI
Patient is a 48 yo F  who was admitted for menorrhagia resulting in severe symptomatic anemia. Patient reports hx of regular monthly periods that are not particularly heavy until last month when she had onset of bleeding now for the past 1 month. Reports heavy every day, using pads, no double up, along with frequent tampons, has gone through 4 boxes of tampons in the past month. +large clots. Denies dysmenorrhea or any pelvic pain or pressure. Denies urinary incontinence or changes in bowel habit. No vaginal discharge or new sexual partners. Hx of tubal ligation for contraception. Reports has been many years since last pap or well woman, at least 5 years but denies any history of abnormal pap smears.     Admitted with H/H 5.0/7.8 s/p 2 units PRBC with H/H now 7.8/25.9.

## 2020-10-13 NOTE — PROGRESS NOTES
[facility] Hospital Medicine  Progress Note    Patient Name: Angeline Waggoner  MRN: [id number]  Patient Class: OP- Observation   Admission Date: 10/12/2020  Length of Stay: 0 days  Attending Physician: Stanley Gracia MD  Primary Care Provider: [facility]        Subjective:     Principal Problem:Symptomatic anemia        HPI:  49 YEAR OLD WOMAN -who presented with history of worsening dizziness over the last  several days . She usually has regular menstrual periods but reports that over the last several weeks ,she has developed persistent vaginal bleeding .   She denies any history of trauma .   In the Ed , initial labs showed - hemoglobin of 5.0, HCT -17.9.        Overview/Hospital Course:  10/13: patient reported dizziness improved after blood transfusions. Reports heavy and irregular menstrual cycles. Iron studies show severe VALERIA. Venofer given. Ferrous sulfate bid. Pelvic u/s pending. Will obtain OGBYN consult.     Interval History: No acute events overnight. Reports improvement in symptoms.     Review of Systems   Constitutional: Positive for fatigue. Negative for fever.   HENT: Negative for dental problem and sinus pressure.    Eyes: Negative for visual disturbance.   Respiratory: Negative for shortness of breath.    Cardiovascular: Negative for chest pain.   Gastrointestinal: Negative for nausea and vomiting.   Genitourinary: Positive for menstrual problem and vaginal bleeding. Negative for difficulty urinating and vaginal discharge.   Musculoskeletal: Negative for back pain.   Skin: Negative for rash.   Neurological: Positive for dizziness and light-headedness. Negative for syncope, weakness and headaches.   Psychiatric/Behavioral: Negative for confusion.     Objective:     Vital Signs (Most Recent):  Temp: 97.9 °F (36.6 °C) (10/13/20 1458)  Pulse: 65 (10/13/20 1458)  Resp: 18 (10/13/20 1458)  BP: (!) 144/65 (10/13/20 1458)  SpO2: 100 % (10/13/20 1458) Vital Signs (24h  Range):  Temp:  [97.7 °F (36.5 °C)-98.9 °F (37.2 °C)] 97.9 °F (36.6 °C)  Pulse:  [] 65  Resp:  [16-18] 18  SpO2:  [97 %-100 %] 100 %  BP: (112-177)/(57-87) 144/65     Weight: 73.6 kg (162 lb 4.1 oz)  Body mass index is 29.68 kg/m².    Intake/Output Summary (Last 24 hours) at 10/13/2020 1533  Last data filed at 10/13/2020 1500  Gross per 24 hour   Intake 1426.83 ml   Output 1200 ml   Net 226.83 ml      Physical Exam  Constitutional:       General: She is not in acute distress.     Appearance: She is well-developed. She is not diaphoretic.   HENT:      Head: Normocephalic and atraumatic.   Eyes:      Pupils: Pupils are equal, round, and reactive to light.      Comments: Conjunctival palor    Cardiovascular:      Rate and Rhythm: Normal rate and regular rhythm.      Heart sounds: Normal heart sounds. No murmur. No friction rub. No gallop.    Pulmonary:      Effort: Pulmonary effort is normal. No respiratory distress.      Breath sounds: Normal breath sounds. No stridor. No wheezing or rales.   Abdominal:      General: Bowel sounds are normal. There is no distension.      Palpations: Abdomen is soft. There is no mass.      Tenderness: There is no abdominal tenderness. There is no guarding.   Musculoskeletal:      Right lower leg: No edema.      Left lower leg: No edema.   Skin:     General: Skin is warm.      Findings: No erythema.   Neurological:      Mental Status: She is alert and oriented to person, place, and time.   Psychiatric:         Mood and Affect: Mood normal.         Behavior: Behavior normal.         Thought Content: Thought content normal.         Judgment: Judgment normal.         Significant Labs:   Recent Lab Results       10/13/20  0618   10/12/20  1950   10/12/20  1546        Unit Blood Type Code   5100[P]          5100       Unit Expiration   [id number][P]          [id number]       Unit Blood Type   O POS[P]          O POS       Anion Gap 6         Baso # 0.04         Basophil% 0.7          Blood Culture, Routine     No Growth to date[P]     BUN, Bld 6         Calcium 7.9         Chloride 113         CO2 19         CODING SYSTEM   [id number][P]          [id number]       Creatinine 0.8         Differential Method Automated         Direct Karli (TARA)   NEG       DISPENSE STATUS   ISSUED[P]          TRANSFUSED       eGFR if  >60         eGFR if non  >60  Comment:  Calculation used to obtain the estimated glomerular filtration  rate (eGFR) is the CKD-EPI equation.            Eos # 0.0         Eosinophil% 0.7         Ferritin   <1       Glucose 97         Gran # (ANC) 3.1         Gran% 58.1         Group & Rh   O POS       Haptoglobin   105       Hematocrit 25.9         Hemoglobin 7.8         Immature Grans (Abs) 0.03  Comment:  Mild elevation in immature granulocytes is non specific and   can be seen in a variety of conditions including stress response,   acute inflammation, trauma and pregnancy. Correlation with other   laboratory and clinical findings is essential.           Immature Granulocytes 0.6         INDIRECT KARLI   NEG       Iron   18       Lymph # 1.7         Lymph% 31.2         Magnesium 1.8         MCH 22.5         MCHC 30.1         MCV 75         Mono # 0.5         Mono% 8.7         MPV 10.6         nRBC 1         Platelets 274         Potassium 3.7         Product Code   D7947U05[P]          D5433T00       RBC 3.46         RDW 19.3         Saturated Iron   3       Sodium 138         TIBC   622       Transferrin   420       UNIT NUMBER   [id number][P]          [id number]       WBC 5.41             All pertinent labs within the past 24 hours have been reviewed.    Significant Imaging: I have reviewed all pertinent imaging results/findings within the past 24 hours.      Assessment/Plan:      * Symptomatic anemia    Related to menorrhagia-will transfuse 2 units of packed red cells, will check iron stores.    10/13:  Severe iron deficiency noted  venofer given  x1  Po ferrous sulfate  VALERIA likely due to menstrual bleeding        Iron deficiency anemia due to chronic blood loss  10/13:  See above      Pre-syncope  10/13:  Pre-syncopal symptoms likely   Due to anemia   Symptoms improved with blood tranfusion  Will order orthostatics x1      Hypokalemia    Will replete , check serum mag     10/13:  resolved    Menorrhagia    Gynae consult , pelvic ultrasound .  Will closely monitor bleeding     10/13:  Pelvic u/s pending  No treatment with hormonal therapy   Currently as etiology of AUB not   Evident as of yet  OBGYN consult pending       VTE Risk Mitigation (From admission, onward)         Ordered     IP VTE HIGH RISK PATIENT  Once      10/12/20 1915     Place sequential compression device  Until discontinued      10/12/20 1915                Discharge Planning   EFRAIN:      Code Status: Full Code   Is the patient medically ready for discharge?:     Reason for patient still in hospital (select all that apply): Patient trending condition                     Stanley Gracia MD  Department of Hospital Medicine   [facility]

## 2020-10-13 NOTE — SUBJECTIVE & OBJECTIVE
Past Medical History:   Diagnosis Date    Hypertension        History reviewed. No pertinent surgical history.    Review of patient's allergies indicates:   Allergen Reactions    Aspirin        No current facility-administered medications on file prior to encounter.      No current outpatient medications on file prior to encounter.     Family History     None        Tobacco Use    Smoking status: Never Smoker   Substance and Sexual Activity    Alcohol use: Never     Frequency: Never    Drug use: Never    Sexual activity: Not on file     Review of Systems   Constitutional: Negative for chills and fatigue.   HENT: Negative for congestion, ear pain, facial swelling, sinus pressure and sore throat.    Eyes: Negative for pain.   Respiratory: Negative for apnea, chest tightness, shortness of breath and stridor.    Cardiovascular: Negative for chest pain, palpitations and leg swelling.   Gastrointestinal: Negative for abdominal distention, abdominal pain, diarrhea and nausea.   Endocrine: Negative for polydipsia and polyphagia.   Genitourinary: Positive for vaginal bleeding. Negative for decreased urine volume, difficulty urinating, frequency and genital sores.   Musculoskeletal: Negative for arthralgias and gait problem.   Neurological: Negative for light-headedness and headaches.   Hematological: Negative for adenopathy.   Psychiatric/Behavioral: Negative for agitation, confusion and decreased concentration.     Objective:     Vital Signs (Most Recent):  Temp: 98.1 °F (36.7 °C) (10/13/20 0320)  Pulse: 72 (10/13/20 0320)  Resp: 18 (10/13/20 0320)  BP: 112/64 (10/13/20 0320)  SpO2: 97 % (10/13/20 0320) Vital Signs (24h Range):  Temp:  [97.9 °F (36.6 °C)-98.9 °F (37.2 °C)] 98.1 °F (36.7 °C)  Pulse:  [] 72  Resp:  [16-20] 18  SpO2:  [97 %-100 %] 97 %  BP: (112-177)/(57-91) 112/64     Weight: 75.1 kg (165 lb 10.8 oz)  Body mass index is 30.3 kg/m².    Physical Exam  Vitals signs and nursing note reviewed.    Constitutional:       Appearance: She is well-developed.   HENT:      Head: Normocephalic and atraumatic.   Eyes:      Pupils: Pupils are equal, round, and reactive to light.   Neck:      Musculoskeletal: Normal range of motion and neck supple.      Thyroid: No thyromegaly.      Trachea: No tracheal deviation.   Cardiovascular:      Rate and Rhythm: Normal rate and regular rhythm.   Pulmonary:      Effort: No respiratory distress.      Breath sounds: No wheezing or rales.   Abdominal:      General: Bowel sounds are normal. There is no distension.      Palpations: Abdomen is soft.      Tenderness: There is no abdominal tenderness.   Skin:     General: Skin is warm and dry.      Coloration: Skin is not pale.   Neurological:      Mental Status: She is alert and oriented to person, place, and time.      Cranial Nerves: No cranial nerve deficit.      Coordination: Coordination normal.      Deep Tendon Reflexes: Reflexes are normal and symmetric.   Psychiatric:         Thought Content: Thought content normal.         Judgment: Judgment normal.           CRANIAL NERVES     CN III, IV, VI   Pupils are equal, round, and reactive to light.       Significant Labs:   BMP:   Recent Labs   Lab 10/12/20  1335   *      K 3.3*      CO2 18*   BUN 7   CREATININE 0.8   CALCIUM 8.4*     CBC:   Recent Labs   Lab 10/12/20  1335   WBC 6.60   HGB 5.0*   HCT 17.9*        All pertinent labs within the past 24 hours have been reviewed.    Significant Imaging: I have reviewed and interpreted all pertinent imaging results/findings within the past 24 hours.

## 2020-10-13 NOTE — PLAN OF CARE
SW met with pt at bedside to complete discharge assessment. Pt lives at home with her daughter. Pt daughter is her help at home. Pt daughter will pick her up when she is discharged. Pt is ambulatory and ADLs. No needs identified at this time. PHILL provided a transitional care folder, information on advanced directives, information on pharmacy bedside delivery, and discharge planning begins on admission with contact information for any needs/questions. Pt board updated with CM name and number.     Payor: MEDICAID / Plan: [place] [facility] / Product Type: Managed Medicaid /   PCP: [facility]  Pharmacy:   [id number] - [place], [place] [id number] [facility] [address] [place] [address]  [place] [address]  Phone: [phone] Fax: [phone]  Bedside Delivery: Yes  MyChart: link sent       10/13/20 3436   Discharge Assessment   Assessment Type Discharge Planning Assessment   Confirmed/corrected address and phone number on facesheet? Yes   Assessment information obtained from? Patient   Expected Length of Stay (days)   (TBD)   Communicated expected length of stay with patient/caregiver yes   Prior to hospitilization cognitive status: Alert/Oriented   Prior to hospitalization functional status: Independent   Current cognitive status: Alert/Oriented   Current Functional Status: Independent   Facility Arrived From: Home   Lives With child(hoang), adult   Able to Return to Prior Arrangements yes   Is patient able to care for self after discharge? Yes   Who are your caregiver(s) and their phone number(s)? Laura Wylie (relative) [phone]   Patient's perception of discharge disposition home or selfcare   Readmission Within the Last 30 Days no previous admission in last 30 days   Patient currently being followed by outpatient case management? No   Patient currently receives any other outside agency services? No   Equipment Currently Used at Home none   Part D Coverage n/a    Do you have any problems affording any of your prescribed medications? No   Is the patient taking medications as prescribed? yes   Does the patient have transportation home? Yes   Transportation Anticipated family or friend will provide   Dialysis Name and Scheduled days n/a   Does the patient receive services at the Coumadin Clinic? No   Discharge Plan A Home with family   Discharge Plan B Home   DME Needed Upon Discharge  none   Patient/Family in Agreement with Plan yes   Does the patient have transportation to healthcare appointments? Yes       Fransisco Toribio LMSW 10/13/2020 4:07 PM

## 2020-10-14 VITALS
HEIGHT: 62 IN | BODY MASS INDEX: 29.86 KG/M2 | RESPIRATION RATE: 18 BRPM | HEART RATE: 77 BPM | WEIGHT: 162.25 LBS | SYSTOLIC BLOOD PRESSURE: 123 MMHG | TEMPERATURE: 98 F | DIASTOLIC BLOOD PRESSURE: 78 MMHG | OXYGEN SATURATION: 98 %

## 2020-10-14 PROBLEM — E87.6 HYPOKALEMIA: Status: RESOLVED | Noted: 2020-10-13 | Resolved: 2020-10-14

## 2020-10-14 PROBLEM — R55 PRE-SYNCOPE: Status: RESOLVED | Noted: 2020-10-13 | Resolved: 2020-10-14

## 2020-10-14 LAB
HCT VFR BLD AUTO: 25.5 % (ref 37–48.5)
HCT VFR BLD AUTO: 26.2 % (ref 37–48.5)
HCT VFR BLD AUTO: 26.8 % (ref 37–48.5)
HGB BLD-MCNC: 7.8 G/DL (ref 12–16)
HGB BLD-MCNC: 7.8 G/DL (ref 12–16)
HGB BLD-MCNC: 7.9 G/DL (ref 12–16)
T4 FREE SERPL-MCNC: 0.82 NG/DL (ref 0.71–1.51)
TSH SERPL DL<=0.005 MIU/L-ACNC: 0.86 UIU/ML (ref 0.4–4)

## 2020-10-14 PROCEDURE — 84439 ASSAY OF FREE THYROXINE: CPT

## 2020-10-14 PROCEDURE — 90686 IIV4 VACC NO PRSV 0.5 ML IM: CPT | Performed by: FAMILY MEDICINE

## 2020-10-14 PROCEDURE — 90472 IMMUNIZATION ADMIN EACH ADD: CPT | Performed by: FAMILY MEDICINE

## 2020-10-14 PROCEDURE — 85014 HEMATOCRIT: CPT | Mod: 91

## 2020-10-14 PROCEDURE — 84443 ASSAY THYROID STIM HORMONE: CPT

## 2020-10-14 PROCEDURE — 25000003 PHARM REV CODE 250: Performed by: FAMILY MEDICINE

## 2020-10-14 PROCEDURE — 25000003 PHARM REV CODE 250: Performed by: PHYSICIAN ASSISTANT

## 2020-10-14 PROCEDURE — 90670 PCV13 VACCINE IM: CPT | Performed by: FAMILY MEDICINE

## 2020-10-14 PROCEDURE — 85014 HEMATOCRIT: CPT

## 2020-10-14 PROCEDURE — 90471 IMMUNIZATION ADMIN: CPT | Performed by: FAMILY MEDICINE

## 2020-10-14 PROCEDURE — G0378 HOSPITAL OBSERVATION PER HR: HCPCS

## 2020-10-14 PROCEDURE — 85018 HEMOGLOBIN: CPT

## 2020-10-14 PROCEDURE — 36415 COLL VENOUS BLD VENIPUNCTURE: CPT

## 2020-10-14 PROCEDURE — 85018 HEMOGLOBIN: CPT | Mod: 91

## 2020-10-14 PROCEDURE — 63600175 PHARM REV CODE 636 W HCPCS: Performed by: FAMILY MEDICINE

## 2020-10-14 RX ORDER — FERROUS SULFATE 325(65) MG
325 TABLET, DELAYED RELEASE (ENTERIC COATED) ORAL 2 TIMES DAILY
Qty: 60 TABLET | Refills: 1 | Status: SHIPPED | OUTPATIENT
Start: 2020-10-14

## 2020-10-14 RX ORDER — MEDROXYPROGESTERONE ACETATE 10 MG/1
10 TABLET ORAL 2 TIMES DAILY
Qty: 60 TABLET | Refills: 1 | Status: SHIPPED | OUTPATIENT
Start: 2020-10-14 | End: 2020-12-03 | Stop reason: SDUPTHER

## 2020-10-14 RX ADMIN — MEDROXYPROGESTERONE ACETATE 10 MG: 5 TABLET ORAL at 09:10

## 2020-10-14 RX ADMIN — INFLUENZA VIRUS VACCINE 0.5 ML: 15; 15; 15; 15 SUSPENSION INTRAMUSCULAR at 03:10

## 2020-10-14 RX ADMIN — FERROUS SULFATE TAB EC 325 MG (65 MG FE EQUIVALENT) 325 MG: 325 (65 FE) TABLET DELAYED RESPONSE at 09:10

## 2020-10-14 RX ADMIN — PNEUMOCOCCAL 13-VALENT CONJUGATE VACCINE 0.5 ML: 2.2; 2.2; 2.2; 2.2; 2.2; 4.4; 2.2; 2.2; 2.2; 2.2; 2.2; 2.2; 2.2 INJECTION, SUSPENSION INTRAMUSCULAR at 03:10

## 2020-10-14 NOTE — SUBJECTIVE & OBJECTIVE
Interval History: Patient reports she is doing well. She reports that the vaginal bleeding resolved this AM. Does have some low back pain, thinking possibly from being in bed for so long. Denies pelvic pain or cramping.       Scheduled Meds:   ferrous sulfate  325 mg Oral BID    medroxyPROGESTERone  10 mg Oral BID     Continuous Infusions:  PRN Meds:sodium chloride, dextrose 50%, dextrose 50%, glucagon (human recombinant), glucose, glucose, pneumoc 13-james conj-dip cr(PF), sodium chloride 0.9%    Review of patient's allergies indicates:   Allergen Reactions    Aspirin        Objective:     Vital Signs (Most Recent):  Temp: 97.1 °F (36.2 °C) (10/14/20 0807)  Pulse: 65 (10/14/20 0807)  Resp: 18 (10/14/20 0807)  BP: 125/74 (10/14/20 0807)  SpO2: 99 % (10/14/20 0807) Vital Signs (24h Range):  Temp:  [97 °F (36.1 °C)-98.6 °F (37 °C)] 97.1 °F (36.2 °C)  Pulse:  [57-76] 65  Resp:  [16-18] 18  SpO2:  [97 %-100 %] 99 %  BP: (123-167)/() 125/74     Weight: 73.6 kg (162 lb 4.1 oz)  Body mass index is 29.68 kg/m².  No LMP recorded.    I&O (Last 24H):    Intake/Output Summary (Last 24 hours) at 10/14/2020 0848  Last data filed at 10/14/2020 0600  Gross per 24 hour   Intake 655 ml   Output 1400 ml   Net -745 ml       Physical Exam:   Constitutional: She is oriented to person, place, and time. She appears well-developed and well-nourished. No distress.       Cardiovascular: Normal rate, regular rhythm and normal heart sounds.    No murmur heard.   Pulmonary/Chest: Effort normal and breath sounds normal. No respiratory distress. She has no wheezes. She has no rales.        Abdominal: Soft. Bowel sounds are normal. She exhibits no distension. There is no abdominal tenderness. There is no guarding.             Musculoskeletal: No edema.      Comments: No calf tenderness       Neurological: She is alert and oriented to person, place, and time.    Skin: Skin is warm and dry. No rash noted. She is not diaphoretic.         Laboratory:  CBC:   Recent Labs   Lab 10/13/20  0618  10/14/20  0018   WBC 5.41  --   --    RBC 3.46*  --   --    HGB 7.8*   < > 7.8*   HCT 25.9*   < > 26.8*     --   --    MCV 75*  --   --    MCH 22.5*  --   --    MCHC 30.1*  --   --     < > = values in this interval not displayed.       Diagnostic Results:  US: Reviewed

## 2020-10-14 NOTE — PROGRESS NOTES
[facility]   Obstetrics & Gynecology  Progress Note    Patient Name: Angeline Waggoner  MRN: [id number]  Admission Date: 10/12/2020  Primary Care Provider: [facility] [place]  Principal Problem: Symptomatic anemia    Subjective:     HPI:  Patient is a 50 yo F  who was admitted for menorrhagia resulting in severe symptomatic anemia. Patient reports hx of regular monthly periods that are not particularly heavy until last month when she had onset of bleeding now for the past 1 month. Reports heavy every day, using pads, no double up, along with frequent tampons, has gone through 4 boxes of tampons in the past month. +large clots. Denies dysmenorrhea or any pelvic pain or pressure. Denies urinary incontinence or changes in bowel habit. No vaginal discharge or new sexual partners. Hx of tubal ligation for contraception. Reports has been many years since last pap or well woman, at least 5 years but denies any history of abnormal pap smears.     Admitted with H/H 5.0/7.8 s/p 2 units PRBC with H/H now 7.8/25.9.        Interval History: Patient reports she is doing well. She reports that the vaginal bleeding resolved this AM. Does have some low back pain, thinking possibly from being in bed for so long. Denies pelvic pain or cramping.       Scheduled Meds:   ferrous sulfate  325 mg Oral BID    medroxyPROGESTERone  10 mg Oral BID     Continuous Infusions:  PRN Meds:sodium chloride, dextrose 50%, dextrose 50%, glucagon (human recombinant), glucose, glucose, pneumoc 13-james conj-dip cr(PF), sodium chloride 0.9%    Review of patient's allergies indicates:   Allergen Reactions    Aspirin        Objective:     Vital Signs (Most Recent):  Temp: 97.1 °F (36.2 °C) (10/14/20 0807)  Pulse: 65 (10/14/20 0807)  Resp: 18 (10/14/20 0807)  BP: 125/74 (10/14/20 0807)  SpO2: 99 % (10/14/20 0807) Vital Signs (24h Range):  Temp:  [97 °F (36.1 °C)-98.6 °F (37 °C)] 97.1 °F (36.2 °C)  Pulse:  [57-76] 65  Resp:   [16-18] 18  SpO2:  [97 %-100 %] 99 %  BP: (123-167)/() 125/74     Weight: 73.6 kg (162 lb 4.1 oz)  Body mass index is 29.68 kg/m².  No LMP recorded.    I&O (Last 24H):    Intake/Output Summary (Last 24 hours) at 10/14/2020 0848  Last data filed at 10/14/2020 0600  Gross per 24 hour   Intake 655 ml   Output 1400 ml   Net -745 ml       Physical Exam:   Constitutional: She is oriented to person, place, and time. She appears well-developed and well-nourished. No distress.       Cardiovascular: Normal rate, regular rhythm and normal heart sounds.    No murmur heard.   Pulmonary/Chest: Effort normal and breath sounds normal. No respiratory distress. She has no wheezes. She has no rales.        Abdominal: Soft. Bowel sounds are normal. She exhibits no distension. There is no abdominal tenderness. There is no guarding.             Musculoskeletal: No edema.      Comments: No calf tenderness       Neurological: She is alert and oriented to person, place, and time.    Skin: Skin is warm and dry. No rash noted. She is not diaphoretic.        Laboratory:  CBC:   Recent Labs   Lab 10/13/20  0618  10/14/20  0018   WBC 5.41  --   --    RBC 3.46*  --   --    HGB 7.8*   < > 7.8*   HCT 25.9*   < > 26.8*     --   --    MCV 75*  --   --    MCH 22.5*  --   --    MCHC 30.1*  --   --     < > = values in this interval not displayed.       Diagnostic Results:  US: Reviewed    Assessment/Plan:     * Symptomatic anemia  S/p 2 units PRBC with appropriate response and H/H stable    Intramural and subserous leiomyoma of uterus  10 wk size uterus; with multiple fibroids  Endometrial cavity wnl  No adnexal mass    Iron deficiency anemia due to chronic blood loss  Will need to continue iron    Menorrhagia  Dysfunctional uterine bleeding with resulting acute on chronic blood loss anemia  -patient will need complete work up as outpatient in GYN clinic, needs updated cervical cancer screening and likely EMB  -Thyroid studies  -Pelvic US:  multiple fibroids, endometrial stripe normal   -appropriate response to PRBC  -Continue Provera 10 mg BID until outpatient follow up    Okay from GYN standpoint to discharge.        Yari Hare PA-C  Obstetrics & Gynecology  [facility]

## 2020-10-14 NOTE — PROGRESS NOTES
[facility]  Obstetrics & Gynecology  Progress Note    Patient Name: Angeline Waggoner  MRN: [id number]  Admission Date: 10/12/2020  Primary Care Provider: [facility]  Principal Problem: Symptomatic anemia    Subjective:     HPI:  Patient is a 48 yo F  who was admitted for menorrhagia resulting in severe symptomatic anemia. Patient reports hx of regular monthly periods that are not particularly heavy until last month when she had onset of bleeding now for the past 1 month. Reports heavy every day, using pads, no double up, along with frequent tampons, has gone through 4 boxes of tampons in the past month. +large clots. Denies dysmenorrhea or any pelvic pain or pressure. Denies urinary incontinence or changes in bowel habit. No vaginal discharge or new sexual partners. Hx of tubal ligation for contraception. Reports has been many years since last pap or well woman, at least 5 years but denies any history of abnormal pap smears.     Admitted with H/H 5.0/7.8 s/p 2 units PRBC with H/H now 7.8/25.9.        No new subjective & objective note has been filed under this hospital service since the last note was generated.    Assessment/Plan:     * Symptomatic anemia  S/p 2 units PRBC with appropriate response    Intramural and subserous leiomyoma of uterus  10 wk size uterus; with multiple fibroids  Endometrial cavity wnl  No adnexal mass    Iron deficiency anemia due to chronic blood loss  Will need to continue iron    Menorrhagia  Dysfunctional uterine bleeding with resulting acute on chronic blood loss anemia  -patient will need complete work up as outpatient in GYN clinic, needs updated cervical cancer screening and likely EMB  -Thyroid studies  -Follow up pelvic US   -appropriate response to PRBC  -Start Provera 10 mg BID for continued heavy bleeding, pad counts        Chhaya iFtch MD  Obstetrics & Gynecology  [facility]

## 2020-10-14 NOTE — ASSESSMENT & PLAN NOTE
Dysfunctional uterine bleeding with resulting acute on chronic blood loss anemia  -patient will need complete work up as outpatient in GYN clinic, needs updated cervical cancer screening and likely EMB  -Thyroid studies  -Pelvic US: multiple fibroids, endometrial stripe normal   -appropriate response to PRBC  -Continue Provera 10 mg BID until outpatient follow up    Okay from GYN standpoint to discharge.

## 2020-10-14 NOTE — PLAN OF CARE
Problem: Adult Inpatient Plan of Care  Goal: Plan of Care Review  Outcome: Ongoing, Progressing   Plan to D/C pt today  Menses stopped per pt  H&H 7.9 and 26.2 and asymptomatic, MD aware  NO falls today  Safety precautions maintained and followed

## 2020-10-14 NOTE — PROGRESS NOTES
Patient discharged with belongings in hand. Wheeled down per hospital staff. Reinforced education, pt verbalized understanding. IV taken out, jelco intact. . Medications reviewed with pt, prescription medications taken home with pt, verbalized understanding. VENKAT

## 2020-10-14 NOTE — PLAN OF CARE
10/14/20 1557   Final Note   Assessment Type Final Discharge Note   Anticipated Discharge Disposition Home   Right Care Referral Info   Post Acute Recommendation No Care       Fransisco Toribio LMSW 10/14/2020 3:57 PM

## 2020-10-14 NOTE — PLAN OF CARE
Problem: Adult Inpatient Plan of Care  Goal: Plan of Care Review  10/14/2020 0602 by Jessa Oropeza RN  Outcome: Ongoing, Progressing  10/14/2020 0602 by Jessa Oropeza RN  Outcome: Ongoing, Progressing  Goal: Patient-Specific Goal (Individualization)  10/14/2020 0602 by Jessa Oropeza RN  Outcome: Ongoing, Progressing  10/14/2020 0602 by Jessa Oropeza RN  Outcome: Ongoing, Progressing  Goal: Absence of Hospital-Acquired Illness or Injury  10/14/2020 0602 by Jessa Oropeza RN  Outcome: Ongoing, Progressing  10/14/2020 0602 by Jessa Oropeza RN  Outcome: Ongoing, Progressing  Goal: Optimal Comfort and Wellbeing  10/14/2020 0602 by Jessa Oropeza RN  Outcome: Ongoing, Progressing  10/14/2020 0602 by Jessa Oropeza RN  Outcome: Ongoing, Progressing  Goal: Readiness for Transition of Care  10/14/2020 0602 by Jessa Oropeza RN  Outcome: Ongoing, Progressing  10/14/2020 0602 by Jessa Oropeza RN  Outcome: Ongoing, Progressing  Goal: Rounds/Family Conference  10/14/2020 0602 by Jessa Oropeza RN  Outcome: Ongoing, Progressing  10/14/2020 0602 by Jessa Oropeza RN  Outcome: Ongoing, Progressing     Problem: Fall Injury Risk  Goal: Absence of Fall and Fall-Related Injury  10/14/2020 0602 by Jessa Oropeza RN  Outcome: Ongoing, Progressing  10/14/2020 0602 by Jessa Oropeza RN  Outcome: Ongoing, Progressing     Problem: Anemia  Goal: Anemia Symptom Improvement  10/14/2020 0602 by Jessa Oropeza RN  Outcome: Ongoing, Progressing  10/14/2020 0602 by Jessa Oropeza RN  Outcome: Ongoing, Progressing

## 2020-10-14 NOTE — PLAN OF CARE
Plan of care reviewed with patient, she verbalized understanding.  Pt remains free from falls this shift, fall precautions in place.  Pt remains free from skin breakdown, she turns and repositions self while in bed and is ambulatory to the restroom independently.  AAOx4, VSS, NAD noted at this time.  Pt remained afebrile.  Room air.  No tele monitor.  Pt admitted for excessive vaginal bleeding; pt states that she has been menstruating for 4 weeks; educated patient on pad counting. OBGYN consulted today.  Pt currently resting comfortably in bed.  Hourly rounding complete.  Will continue to monitor.

## 2020-10-16 NOTE — DISCHARGE SUMMARY
[facility] Hospital Medicine  Discharge Summary      Patient Name: Angeline Waggoner  MRN: [id number]  Admission Date: 10/12/2020  Hospital Length of Stay: 0 days  Discharge Date and Time: 10/14/2020  5:07 PM  Attending Physician: Manda att. providers found   Discharging Provider: Alok Gracia MD  Primary Care Provider: [facility]      HPI:   49 YEAR OLD WOMAN -who presented with history of worsening dizziness over the last  several days . She usually has regular menstrual periods but reports that over the last several weeks ,she has developed persistent vaginal bleeding .   She denies any history of trauma .   In the Ed , initial labs showed - hemoglobin of 5.0, HCT -17.9.        * No surgery found *      Hospital Course:   Patient was admitted for presyncope secondary to anemia from menorrhagia. PRBC were transfused, iron studies showed VALERIA. Venofer was given and ferrous sulfate was initiated. Pelvic u/s showed fibroids. OBGYN on case started provera. Patient's symptoms improved and she was discharged home with instructions to follow up with OBGYN outpatient.        Consults:   Consults (From admission, onward)        Status Ordering Provider     Inpatient consult to OB/GYN  Once     Provider:  (Not yet assigned)    Completed ALOK GRACIA new Assessment & Plan notes have been filed under this hospital service since the last note was generated.  Service: Hospital Medicine    Final Active Diagnoses:    Diagnosis Date Noted POA    PRINCIPAL PROBLEM:  Symptomatic anemia [D64.9] 10/12/2020 Yes    Menorrhagia [N92.0] 10/13/2020 Yes    Iron deficiency anemia due to chronic blood loss [D50.0] 10/13/2020 Yes    Intramural and subserous leiomyoma of uterus [D25.1, D25.2] 10/13/2020 Yes      Problems Resolved During this Admission:    Diagnosis Date Noted Date Resolved POA    Hypokalemia [E87.6] 10/13/2020 10/14/2020 Yes    Pre-syncope [R55] 10/13/2020 10/14/2020 Yes       Discharged  Condition: good    Disposition: Home or Self Care    Follow Up:  Follow-up Information     Chhaya Fitch MD In 2 weeks.    Specialty: Obstetrics and Gynecology  Why: hospital follow up  Contact information:  [address] [place] [address] [phone]                 Patient Instructions:   No discharge procedures on file.    Significant Diagnostic Studies: Labs:   BMP: No results for input(s): GLU, NA, K, CL, CO2, BUN, CREATININE, CALCIUM, MG in the last 48 hours. and CBC   Recent Labs   Lab 10/14/20  1215   HGB 7.9*   HCT 26.2*       Pending Diagnostic Studies:     None         Medications:  Reconciled Home Medications:      Medication List      START taking these medications    ferrous sulfate 325 (65 FE) MG EC tablet  Take 1 tablet (325 mg total) by mouth 2 (two) times daily.     medroxyPROGESTERone 10 MG tablet  Commonly known as: PROVERA  Take 1 tablet (10 mg total) by mouth 2 (two) times a day.            Indwelling Lines/Drains at time of discharge:   Lines/Drains/Airways     None                 Time spent on the discharge of patient: 36 minutes  Patient was seen and examined on the date of discharge and determined to be suitable for discharge.         Stanley Gracia MD  Department of Hospital Medicine  [facility]

## 2020-10-18 LAB
BACTERIA BLD CULT: NORMAL
BACTERIA BLD CULT: NORMAL

## 2020-11-10 ENCOUNTER — TELEPHONE (OUTPATIENT)
Dept: OBSTETRICS AND GYNECOLOGY | Facility: CLINIC | Age: 50
End: 2020-11-10

## 2020-12-03 ENCOUNTER — TELEPHONE (OUTPATIENT)
Dept: OBSTETRICS AND GYNECOLOGY | Facility: CLINIC | Age: 50
End: 2020-12-03

## 2020-12-03 RX ORDER — MEDROXYPROGESTERONE ACETATE 10 MG/1
10 TABLET ORAL 2 TIMES DAILY
Qty: 60 TABLET | Refills: 0 | Status: SHIPPED | OUTPATIENT
Start: 2020-12-03 | End: 2021-12-03

## 2020-12-03 NOTE — TELEPHONE ENCOUNTER
"Called patient rescheduled appointment she missed on 11/10.  Patient stated she just got her prescription refilled (pills to help her bleeding) and her mother threw them away.  Patient prescribed provera 10mg.  Patient requesting refill.  Will send to [facility].  Advised patient since she just got refills her insurance may not cover.  Patient stated "I'll pay the difference".   "

## 2020-12-03 NOTE — TELEPHONE ENCOUNTER
----- Message from Tess Wright sent at 12/3/2020  7:21 AM CST -----  Contact: self  Type:  Sooner Apoointment Request    Caller is requesting a sooner appointment.  Caller declined first available appointment listed below.  Caller will not accept being placed on the waitlist and is requesting a message be sent to doctor.  Name of Caller:Angeline Marion Rosaline  When is the first available appointment?n/a  Symptoms:n/a  Would the patient rather a call back or a response via [facility]? Call back  Best Call Back Number:[phone]  Additional Information: pt wants to see Dr. Fitch.

## 2020-12-29 ENCOUNTER — TELEPHONE (OUTPATIENT)
Dept: OBSTETRICS AND GYNECOLOGY | Facility: CLINIC | Age: 50
End: 2020-12-29

## 2020-12-29 NOTE — TELEPHONE ENCOUNTER
Attempted to contact patient, no answer. Left patient voice mail to return call to clinic.    Pt have to rs appt, provider left for emergency CS

## 2022-05-07 ENCOUNTER — HOSPITAL ENCOUNTER (EMERGENCY)
Facility: HOSPITAL | Age: 52
Discharge: HOME | End: 2022-05-07
Attending: EMERGENCY MEDICINE
Payer: MEDICAID

## 2022-05-07 VITALS
DIASTOLIC BLOOD PRESSURE: 83 MMHG | RESPIRATION RATE: 18 BRPM | SYSTOLIC BLOOD PRESSURE: 164 MMHG | TEMPERATURE: 99 F | BODY MASS INDEX: 29.44 KG/M2 | HEIGHT: 62 IN | OXYGEN SATURATION: 100 % | WEIGHT: 160 LBS | HEART RATE: 65 BPM

## 2022-05-07 DIAGNOSIS — T78.2XXA ANAPHYLAXIS, INITIAL ENCOUNTER: Primary | ICD-10-CM

## 2022-05-07 LAB
ALBUMIN SERPL BCP-MCNC: 3.8 G/DL (ref 3.5–5.2)
ALP SERPL-CCNC: 56 U/L (ref 55–135)
ALT SERPL W/O P-5'-P-CCNC: 13 U/L (ref 10–44)
ANION GAP SERPL CALC-SCNC: 10 MMOL/L (ref 8–16)
AST SERPL-CCNC: 15 U/L (ref 10–40)
BASOPHILS # BLD AUTO: 0.05 K/UL (ref 0–0.2)
BASOPHILS NFR BLD: 0.7 % (ref 0–1.9)
BILIRUB SERPL-MCNC: 0.2 MG/DL (ref 0.1–1)
BUN SERPL-MCNC: 11 MG/DL (ref 6–20)
CALCIUM SERPL-MCNC: 9.4 MG/DL (ref 8.7–10.5)
CHLORIDE SERPL-SCNC: 112 MMOL/L (ref 95–110)
CO2 SERPL-SCNC: 17 MMOL/L (ref 23–29)
CREAT SERPL-MCNC: 0.7 MG/DL (ref 0.5–1.4)
DIFFERENTIAL METHOD: ABNORMAL
EOSINOPHIL # BLD AUTO: 0.1 K/UL (ref 0–0.5)
EOSINOPHIL NFR BLD: 1.7 % (ref 0–8)
ERYTHROCYTE [DISTWIDTH] IN BLOOD BY AUTOMATED COUNT: 20.6 % (ref 11.5–14.5)
EST. GFR  (AFRICAN AMERICAN): >60 ML/MIN/1.73 M^2
EST. GFR  (NON AFRICAN AMERICAN): >60 ML/MIN/1.73 M^2
GLUCOSE SERPL-MCNC: 101 MG/DL (ref 70–110)
HCT VFR BLD AUTO: 30.7 % (ref 37–48.5)
HGB BLD-MCNC: 9.1 G/DL (ref 12–16)
IMM GRANULOCYTES # BLD AUTO: 0.01 K/UL (ref 0–0.04)
IMM GRANULOCYTES NFR BLD AUTO: 0.1 % (ref 0–0.5)
LYMPHOCYTES # BLD AUTO: 2.1 K/UL (ref 1–4.8)
LYMPHOCYTES NFR BLD: 30.2 % (ref 18–48)
MCH RBC QN AUTO: 21.3 PG (ref 27–31)
MCHC RBC AUTO-ENTMCNC: 29.6 G/DL (ref 32–36)
MCV RBC AUTO: 72 FL (ref 82–98)
MONOCYTES # BLD AUTO: 0.8 K/UL (ref 0.3–1)
MONOCYTES NFR BLD: 11.7 % (ref 4–15)
NEUTROPHILS # BLD AUTO: 3.9 K/UL (ref 1.8–7.7)
NEUTROPHILS NFR BLD: 55.6 % (ref 38–73)
NRBC BLD-RTO: 0 /100 WBC
PLATELET # BLD AUTO: 393 K/UL (ref 150–450)
PMV BLD AUTO: 10.6 FL (ref 9.2–12.9)
POTASSIUM SERPL-SCNC: 3.5 MMOL/L (ref 3.5–5.1)
PROT SERPL-MCNC: 7.9 G/DL (ref 6–8.4)
RBC # BLD AUTO: 4.28 M/UL (ref 4–5.4)
SODIUM SERPL-SCNC: 139 MMOL/L (ref 136–145)
WBC # BLD AUTO: 6.93 K/UL (ref 3.9–12.7)

## 2022-05-07 PROCEDURE — 96361 HYDRATE IV INFUSION ADD-ON: CPT

## 2022-05-07 PROCEDURE — 99284 EMERGENCY DEPT VISIT MOD MDM: CPT | Mod: 25

## 2022-05-07 PROCEDURE — 63600175 PHARM REV CODE 636 W HCPCS: Performed by: PHYSICIAN ASSISTANT

## 2022-05-07 PROCEDURE — 96374 THER/PROPH/DIAG INJ IV PUSH: CPT

## 2022-05-07 PROCEDURE — 80053 COMPREHEN METABOLIC PANEL: CPT | Performed by: PHYSICIAN ASSISTANT

## 2022-05-07 PROCEDURE — 25000003 PHARM REV CODE 250: Performed by: PHYSICIAN ASSISTANT

## 2022-05-07 PROCEDURE — 96372 THER/PROPH/DIAG INJ SC/IM: CPT | Performed by: PHYSICIAN ASSISTANT

## 2022-05-07 PROCEDURE — 85025 COMPLETE CBC W/AUTO DIFF WBC: CPT | Performed by: PHYSICIAN ASSISTANT

## 2022-05-07 RX ORDER — EPINEPHRINE 0.3 MG/.3ML
0.3 INJECTION SUBCUTANEOUS
Status: COMPLETED | OUTPATIENT
Start: 2022-05-07 | End: 2022-05-07

## 2022-05-07 RX ORDER — METHYLPREDNISOLONE SOD SUCC 125 MG
125 VIAL (EA) INJECTION
Status: COMPLETED | OUTPATIENT
Start: 2022-05-07 | End: 2022-05-07

## 2022-05-07 RX ORDER — EPINEPHRINE 0.1 MG/ML
0.3 INJECTION INTRAVENOUS
Status: DISCONTINUED | OUTPATIENT
Start: 2022-05-07 | End: 2022-05-07

## 2022-05-07 RX ORDER — EPINEPHRINE 0.3 MG/.3ML
1 INJECTION SUBCUTANEOUS
Qty: 2 EACH | Refills: 0 | Status: SHIPPED | OUTPATIENT
Start: 2022-05-07 | End: 2023-05-07

## 2022-05-07 RX ORDER — FAMOTIDINE 20 MG/1
40 TABLET, FILM COATED ORAL
Status: COMPLETED | OUTPATIENT
Start: 2022-05-07 | End: 2022-05-07

## 2022-05-07 RX ADMIN — METHYLPREDNISOLONE SODIUM SUCCINATE 125 MG: 125 INJECTION, POWDER, FOR SOLUTION INTRAMUSCULAR; INTRAVENOUS at 04:05

## 2022-05-07 RX ADMIN — FAMOTIDINE 40 MG: 20 TABLET ORAL at 04:05

## 2022-05-07 RX ADMIN — EPINEPHRINE 0.3 MG: 0.3 INJECTION INTRAMUSCULAR at 04:05

## 2022-05-07 RX ADMIN — SODIUM CHLORIDE 1000 ML: 0.9 INJECTION, SOLUTION INTRAVENOUS at 04:05

## 2022-05-07 NOTE — ED PROVIDER NOTES
Encounter Date: 5/7/2022       History     Chief Complaint   Patient presents with    Allergic Reaction     Patient reports while at work started to itch and swelling to left eye, states Some mild SOB at the time.      Chief Complaint: Allergic reaction  History of  Present Illness: History obtained from patient. This 51 y.o. female who has past medical history of hypertension presents to the ED complaining of allergic reaction that began after touching an object at work.  Patient states that she broke out in a rash that began feeling short of breath with hoarse voice.  Patient treated at work with Benadryl with resolution of rash.  However, she states she continues to feel short of breath with a hoarse voice.  Denies difficulty swallowing, fever, chest pain, cough, nausea, vomiting.          Review of patient's allergies indicates:   Allergen Reactions    Peaches [peach (prunus persica)] Swelling    Shellfish containing products Swelling    Aspirin      Past Medical History:   Diagnosis Date    Hypertension      Past Surgical History:   Procedure Laterality Date    TUBAL LIGATION       No family history on file.  Social History     Tobacco Use    Smoking status: Never Smoker   Substance Use Topics    Alcohol use: Never    Drug use: Never     Review of Systems   Constitutional: Negative for chills and fever.   HENT: Positive for voice change. Negative for congestion, rhinorrhea, sore throat and trouble swallowing.    Eyes: Negative for visual disturbance.   Respiratory: Positive for shortness of breath. Negative for cough.    Cardiovascular: Negative for chest pain.   Gastrointestinal: Negative for abdominal pain, diarrhea, nausea and vomiting.   Genitourinary: Negative for dysuria, frequency and hematuria.   Musculoskeletal: Negative for back pain.   Skin: Positive for rash.   Neurological: Negative for dizziness, weakness and headaches.       Physical Exam     Initial Vitals [05/07/22 1548]   BP Pulse  Resp Temp SpO2   (!) 162/91 100 20 98.3 °F (36.8 °C) 95 %      MAP       --         Physical Exam    Nursing note and vitals reviewed.  Constitutional: She appears well-developed and well-nourished. No distress.   HENT:   Head: Normocephalic and atraumatic.   Right Ear: Tympanic membrane normal.   Left Ear: Tympanic membrane normal.   Nose: Nose normal.   Mouth/Throat: Uvula is midline, oropharynx is clear and moist and mucous membranes are normal.   Eyes: EOM are normal. Pupils are equal, round, and reactive to light.   Neck: Trachea normal. Neck supple. No stridor present.   Patient is speaking in full clear sentences.  Patient's voice is hoarse.   Normal range of motion.   Full passive range of motion without pain.     Cardiovascular: Normal rate, regular rhythm and normal heart sounds. Exam reveals no gallop and no friction rub.    No murmur heard.  Pulmonary/Chest: Effort normal and breath sounds normal. No respiratory distress. She has no wheezes. She has no rhonchi. She has no rales.   Abdominal: Abdomen is soft. Bowel sounds are normal. There is no abdominal tenderness. There is no rebound and no guarding.   Musculoskeletal:         General: Normal range of motion.      Cervical back: Full passive range of motion without pain, normal range of motion and neck supple. No rigidity. No spinous process tenderness or muscular tenderness. Normal range of motion.     Neurological: She is alert and oriented to person, place, and time. She has normal strength. No cranial nerve deficit or sensory deficit.   Skin: Skin is warm and dry. Capillary refill takes less than 2 seconds.   Psychiatric: She has a normal mood and affect.         ED Course   Procedures  Labs Reviewed   CBC W/ AUTO DIFFERENTIAL - Abnormal; Notable for the following components:       Result Value    Hemoglobin 9.1 (*)     Hematocrit 30.7 (*)     MCV 72 (*)     MCH 21.3 (*)     MCHC 29.6 (*)     RDW 20.6 (*)     All other components within normal  limits   COMPREHENSIVE METABOLIC PANEL - Abnormal; Notable for the following components:    Chloride 112 (*)     CO2 17 (*)     All other components within normal limits          Imaging Results    None          Medications   sodium chloride 0.9% bolus 1,000 mL (0 mLs Intravenous Stopped 5/7/22 1713)   methylPREDNISolone sodium succinate injection 125 mg (125 mg Intravenous Given by Other 5/7/22 1614)   famotidine tablet 40 mg (40 mg Oral Given 5/7/22 1615)   EPINEPHrine (EPIPEN) 0.3 mg/0.3 mL pen injection 0.3 mg (0.3 mg Intramuscular Given 5/7/22 1616)     Medical Decision Making:   Initial Assessment:   This is an evaluation of a 51-year-old female with history of hypertension who presents the emergency department complaining of earlier treat reaction with associated rash, voice hoarseness and shortness of breath.  On initial exam, patient is nontoxic appearing and in no acute distress.  Vital signs are stable.  Afebrile.  Patient's voice is hoarse.  Patient is speaking in full clear sentences.  Posterior oropharynx clear.  Uvula is midline.  Airway is patent.  No rashes appreciated on exam.    Given history and exam, I am concerned for anaphylaxis.  IM epinephrine administered.  Solu-Medrol administered.  Will observe patient in the emergency department for new or worsening symptoms.  Differential Diagnosis:   Anaphylaxis, contact dermatitis, drug eruption  ED Management:  Discussed the case with Bruce Rae NP who will resume care for the patient at the end of my shift.             Patient observed for 4 hours in the emergency department without any recurrence of symptoms.  States that she is ready for discharge on that she feels good.  Will discharge with prescription for EpiPen.  Advised patient to follow-up with Allergy.                      Clinical Impression:   Final diagnoses:  [T78.2XXA] Anaphylaxis, initial encounter (Primary)          ED Disposition Condition    Discharge Stable        ED  Prescriptions     Medication Sig Dispense Start Date End Date Auth. Provider    EPINEPHrine (EPIPEN) 0.3 mg/0.3 mL AtIn Inject 0.3 mLs (0.3 mg total) into the muscle as needed (allergic reaction). 2 each 5/7/2022 5/7/2023 Edilson Leger PA-C        Follow-up Information     Follow up With Specialties Details Why Contact Info    [facility]   [address] [facility] [address]  [phone]    [facility] Emergency Dept Emergency Medicine Go in 1 day If symptoms worsen [address] [facility] [id number] [phone]           Bruce Rae NP  05/07/22 1942

## 2022-05-07 NOTE — Clinical Note
"Angeline"Mary Waggoner was seen and treated in our emergency department on 5/7/2022.  She may return to work on 05/11/2022.       If you have any questions or concerns, please don't hesitate to call.      Edilson Leger PA-C"